# Patient Record
Sex: FEMALE | Race: WHITE | NOT HISPANIC OR LATINO | Employment: PART TIME | URBAN - METROPOLITAN AREA
[De-identification: names, ages, dates, MRNs, and addresses within clinical notes are randomized per-mention and may not be internally consistent; named-entity substitution may affect disease eponyms.]

---

## 2017-01-23 DIAGNOSIS — N95.1 MENOPAUSAL SYNDROME (HOT FLASHES): Primary | ICD-10-CM

## 2017-01-23 RX ORDER — CITALOPRAM HYDROBROMIDE 20 MG/1
20 TABLET ORAL DAILY
Qty: 90 TABLET | Refills: 2 | Status: SHIPPED | OUTPATIENT
Start: 2017-01-23 | End: 2018-11-26

## 2017-01-23 NOTE — TELEPHONE ENCOUNTER
Routing refill request to provider for review/approval because:  Drug not on the [facility] refill protocol for dx attached.  Pt is also due for px  Do you want to give a 30 or 90 day supply?  Latoya Ramon RN, BSN

## 2017-01-23 NOTE — TELEPHONE ENCOUNTER
Pending Prescriptions:                       Disp   Refills    citalopram (CELEXA) 20 MG tablet          90 tab*3            Sig: Take 1 tablet (20 mg) by mouth daily         Last Written Prescription Date: 12/29/2015  Last Fill Quantity: 90, # refills: 3  Last Office Visit with [facility] primary care provider:  08/23/2016        Last PHQ-9 score on record=   PHQ-9 SCORE 5/14/2013   Total Score 6     Harjeet MIRANDA

## 2017-05-01 ENCOUNTER — TELEPHONE (OUTPATIENT)
Dept: FAMILY MEDICINE | Facility: CLINIC | Age: 57
End: 2017-05-01

## 2017-05-01 NOTE — TELEPHONE ENCOUNTER
5/1/2017     Call Regarding Preventive Health Screening Mammogram  Attempt 1     Message on voicemail   Comments:         Outreach   JULIO

## 2017-05-05 NOTE — TELEPHONE ENCOUNTER
5/4/2017     Call Regarding Preventive Health Screening Mammogram  Attempt 1     Message Scheduled  Comments:         Outreach   JULIO

## 2017-05-18 ENCOUNTER — HOSPITAL ENCOUNTER (OUTPATIENT)
Dept: MAMMOGRAPHY | Facility: CLINIC | Age: 57
Discharge: HOME | End: 2017-05-18
Attending: PHYSICIAN ASSISTANT | Admitting: PHYSICIAN ASSISTANT
Payer: COMMERCIAL

## 2017-05-18 DIAGNOSIS — Z12.31 VISIT FOR SCREENING MAMMOGRAM: ICD-10-CM

## 2017-05-18 PROCEDURE — G0202 SCR MAMMO BI INCL CAD: HCPCS

## 2017-05-26 ENCOUNTER — OFFICE VISIT (OUTPATIENT)
Dept: FAMILY MEDICINE | Facility: CLINIC | Age: 57
End: 2017-05-26
Payer: COMMERCIAL

## 2017-05-26 VITALS
OXYGEN SATURATION: 98 % | DIASTOLIC BLOOD PRESSURE: 82 MMHG | HEART RATE: 62 BPM | WEIGHT: 189.5 LBS | BODY MASS INDEX: 29.74 KG/M2 | SYSTOLIC BLOOD PRESSURE: 118 MMHG | HEIGHT: 67 IN | TEMPERATURE: 98 F

## 2017-05-26 DIAGNOSIS — Z11.59 NEED FOR HEPATITIS C SCREENING TEST: ICD-10-CM

## 2017-05-26 DIAGNOSIS — Z12.83 SKIN CANCER SCREENING: ICD-10-CM

## 2017-05-26 DIAGNOSIS — L60.0 INGROWN LEFT BIG TOENAIL: Primary | ICD-10-CM

## 2017-05-26 PROCEDURE — 86803 HEPATITIS C AB TEST: CPT | Performed by: PHYSICIAN ASSISTANT

## 2017-05-26 PROCEDURE — 36415 COLL VENOUS BLD VENIPUNCTURE: CPT | Performed by: PHYSICIAN ASSISTANT

## 2017-05-26 PROCEDURE — 99213 OFFICE O/P EST LOW 20 MIN: CPT | Performed by: PHYSICIAN ASSISTANT

## 2017-05-26 NOTE — PROGRESS NOTES
SUBJECTIVE:                                                    Marisol Wang is a 57 year old female who presents to clinic today for the following health issues:    [place] and did a lot of hiking and lost 8 out of her 10 toenails. When her left big toe nail grow back she has had swelling and erythema along the medial side of the toenail and it's quite painful and does not seem to want to resolve.      Also, she has additional complaints of needs skin cancer screen.  Has a lesion of forehead that wont resolve.        Problem list and histories reviewed & adjusted, as indicated.  Additional history: as documented    Current Outpatient Prescriptions   Medication Sig Dispense Refill     citalopram (CELEXA) 20 MG tablet Take 1 tablet (20 mg) by mouth daily 90 tablet 2     traZODone (DESYREL) 50 MG tablet Take 2 tablets (100 mg) by mouth nightly as needed for sleep 180 tablet 3     frovatriptan (FROVA) 2.5 MG tablet Take 1 tablet (2.5 mg) by mouth at onset of headache for migraine May repeat dose in 2 hours.  Do not exceed 7.5 mg in 24 hours 18 tablet 0     IBUPROFEN        acetaZOLAMIDE (DIAMOX) 125 MG tablet Take 1 tablet (125 mg) by mouth 2 times daily for 14 days 28 tablet 0     BP Readings from Last 3 Encounters:   05/26/17 118/82   08/23/16 103/68   12/29/15 110/80    Wt Readings from Last 3 Encounters:   05/26/17 189 lb 8 oz (86 kg)   08/23/16 186 lb 6.4 oz (84.6 kg)   12/29/15 191 lb (86.6 kg)                    Reviewed and updated as needed this visit by clinical staff  Tobacco  Allergies  Meds  Med Hx  Surg Hx  Fam Hx  Soc Hx      Reviewed and updated as needed this visit by Provider         ROS:  Constitutional, HEENT, cardiovascular, pulmonary, gi and gu systems are negative, except as otherwise noted.    OBJECTIVE:                                                    /82 (BP Location: Right arm, Patient Position: Chair, Cuff Size: Adult Large)  Pulse 62  Temp 98  F (36.7  " C) (Oral)  Ht 5' 6.75\" (1.695 m)  Wt 189 lb 8 oz (86 kg)  SpO2 98%  BMI 29.9 kg/m2  Body mass index is 29.9 kg/(m^2).  MS: erythema and swelling on medial side of toenail of left 1st metatarsal  Skin: papule on rt side of forehead       ASSESSMENT/PLAN:                                                    1. Ingrown left big toenail  Well refer to podiatry for further eval and plan.    - PODIATRY/FOOT & ANKLE SURGERY REFERRAL    2. Skin cancer screening    - DERMATOLOGY REFERRAL    3. Need for hepatitis C screening test    - Hepatitis C Screen Reflex to HCV RNA Quant and Genotype      There are no Patient Instructions on file for this visit.    Ramona Ann Aaseby-Aguilera, PA-C  [facility]    "

## 2017-05-26 NOTE — LETTER
[facility]          [address] [place] [address]                                                                                                       [phone]      Marisol Wang  [address]  [facility] [address]      Dear Marisol,        The results of your recent test were within acceptable range.  Please follow-up if symptoms fail to resolve or worsen.  Enclosed is a copy of the results.      Thank you for choosing [facility]. We appreciate the opportunity to serve you and look forward to supporting your healthcare needs in the future.    If you have any questions or concerns, please call me or my nurse at [phone].        Sincerely,    Savana Avery PA-C      Results for orders placed or performed in visit on 05/26/17   Hepatitis C Screen Reflex to HCV RNA Quant and Genotype   Result Value Ref Range    Hepatitis C Antibody  NR     Nonreactive   Assay performance characteristics have not been established for newborns,   infants, and children

## 2017-05-26 NOTE — NURSING NOTE
"Chief Complaint   Patient presents with     Toe Pain     left big toe       Initial /82 (BP Location: Right arm, Patient Position: Chair, Cuff Size: Adult Large)  Pulse 62  Temp 98  F (36.7  C) (Oral)  Ht 5' 6.75\" (1.695 m)  Wt 189 lb 8 oz (86 kg)  SpO2 98%  BMI 29.9 kg/m2 Estimated body mass index is 29.9 kg/(m^2) as calculated from the following:    Height as of this encounter: 5' 6.75\" (1.695 m).    Weight as of this encounter: 189 lb 8 oz (86 kg).  Medication Reconciliation: complete    "

## 2017-05-26 NOTE — MR AVS SNAPSHOT
After Visit Summary   5/26/2017    Marisol Wang    MRN: [id number]           Patient Information     Date Of Birth          1960        Visit Information        Provider Department      5/26/2017 11:15 AM Aaseby-Aguilera, Ramona Ann, PA-C [facility]        Today's Diagnoses     Ingrown left big toenail    -  1    Skin cancer screening        Need for hepatitis C screening test           Follow-ups after your visit        Additional Services     DERMATOLOGY REFERRAL       Your provider has referred you to: [facility] [phone]   [email or web address]    Please be aware that coverage of these services is subject to the terms and limitations of your health insurance plan.  Call member services at your health plan with any benefit or coverage questions.      Please bring the following with you to your appointment:    (1) Any X-Rays, CTs or MRIs which have been performed.  Contact the facility where they were done to arrange for  prior to your scheduled appointment.    (2) List of current medications  (3) This referral request   (4) Any documents/labs given to you for this referral            PODIATRY/FOOT & ANKLE SURGERY REFERRAL       Your provider has rrgf2znmd you to: [facility]: [facility] [phone]   [email or web address]    Please be aware that coverage of these services is subject to the terms and limitations of your health insurance plan.  Call member services at your health plan with any benefit or coverage questions.      Please bring the following to your appointment:  >>   Any x-rays, CTs or MRIs which have been performed.  Contact the facility where they were done to arrange for  prior to your scheduled appointment.    >>   List of current medications   >>   This referral request   >>   Any documents/labs given to you for this referral                  Who to  "contact     If you have questions or need follow up information about today's clinic visit or your schedule please contact [facility] directly at [phone].  Normal or non-critical lab and imaging results will be communicated to you by MyChart, letter or phone within 4 business days after the clinic has received the results. If you do not hear from us within 7 days, please contact the clinic through MyChart or phone. If you have a critical or abnormal lab result, we will notify you by phone as soon as possible.  Submit refill requests through [employer] or call your pharmacy and they will forward the refill request to us. Please allow 3 business days for your refill to be completed.          Additional Information About Your Visit        [employer] Information     [employer] lets you send messages to your doctor, view your test results, renew your prescriptions, schedule appointments and more. To sign up, go to [email or web address] . Click on \"Log in\" on the left side of the screen, which will take you to the Welcome page. Then click on \"Sign up Now\" on the right side of the page.     You will be asked to enter the access code listed below, as well as some personal information. Please follow the directions to create your username and password.     Your access code is: [email or web address]  Expires: 2017 11:34 AM     Your access code will  in 90 days. If you need help or a new code, please call your [place] clinic or [phone].        Care EveryWhere ID     This is your Care EveryWhere ID. This could be used by other organizations to access your [place] medical records  [id number]        Your Vitals Were     Pulse Temperature Height Pulse Oximetry BMI (Body Mass Index)       62 98  F (36.7  C) (Oral) 5' 6.75\" (1.695 m) 98% 29.9 kg/m2        Blood Pressure from Last 3 Encounters:   17 118/82   16 103/68   12/29/15 110/80    Weight from Last 3 Encounters:   17 189 lb 8 oz (86 " kg)   08/23/16 186 lb 6.4 oz (84.6 kg)   12/29/15 191 lb (86.6 kg)              We Performed the Following     DERMATOLOGY REFERRAL     Hepatitis C Screen Reflex to HCV RNA Quant and Genotype     PODIATRY/FOOT & ANKLE SURGERY REFERRAL        Primary Care Provider Office Phone # Fax #    Savana Ann Aaseby-Aguilera, PA-C [phone]       [facility] [address]  [facility] [address]        Thank you!     Thank you for choosing [facility]  for your care. Our goal is always to provide you with excellent care. Hearing back from our patients is one way we can continue to improve our services. Please take a few minutes to complete the written survey that you may receive in the mail after your visit with us. Thank you!             Your Updated Medication List - Protect others around you: Learn how to safely use, store and throw away your medicines at www.disposemymeds.org.          This list is accurate as of: 5/26/17 11:34 AM.  Always use your most recent med list.                   Brand Name Dispense Instructions for use    acetaZOLAMIDE 125 MG tablet    DIAMOX    28 tablet    Take 1 tablet (125 mg) by mouth 2 times daily for 14 days       citalopram 20 MG tablet    celeXA    90 tablet    Take 1 tablet (20 mg) by mouth daily       frovatriptan 2.5 MG tablet    FROVA    18 tablet    Take 1 tablet (2.5 mg) by mouth at onset of headache for migraine May repeat dose in 2 hours.  Do not exceed 7.5 mg in 24 hours       IBUPROFEN          traZODone 50 MG tablet    DESYREL    180 tablet    Take 2 tablets (100 mg) by mouth nightly as needed for sleep

## 2017-05-28 LAB — HCV AB SERPL QL IA: NORMAL

## 2017-06-09 ENCOUNTER — RADIANT APPOINTMENT (OUTPATIENT)
Dept: GENERAL RADIOLOGY | Facility: CLINIC | Age: 57
End: 2017-06-09
Attending: PODIATRIST
Payer: COMMERCIAL

## 2017-06-09 ENCOUNTER — OFFICE VISIT (OUTPATIENT)
Dept: PODIATRY | Facility: CLINIC | Age: 57
End: 2017-06-09
Payer: COMMERCIAL

## 2017-06-09 VITALS — RESPIRATION RATE: 16 BRPM | BODY MASS INDEX: 29.66 KG/M2 | HEIGHT: 67 IN | WEIGHT: 189 LBS

## 2017-06-09 DIAGNOSIS — L60.0 INGROWING NAIL: ICD-10-CM

## 2017-06-09 DIAGNOSIS — M77.8 CAPSULITIS OF RIGHT FOOT: ICD-10-CM

## 2017-06-09 DIAGNOSIS — M77.8 CAPSULITIS OF RIGHT FOOT: Primary | ICD-10-CM

## 2017-06-09 PROCEDURE — 11730 AVULSION NAIL PLATE SIMPLE 1: CPT | Mod: TA | Performed by: PODIATRIST

## 2017-06-09 PROCEDURE — 73630 X-RAY EXAM OF FOOT: CPT | Mod: RT

## 2017-06-09 PROCEDURE — 99203 OFFICE O/P NEW LOW 30 MIN: CPT | Mod: 25 | Performed by: PODIATRIST

## 2017-06-09 NOTE — PATIENT INSTRUCTIONS
DR. MUNGUIA'S CLINIC LOCATIONS:       MONDAY TUESDAY   [facility]   [address] [address] [address], [address]   [place] [address] [place] [address]   [phone]       WEDNESDAY:  SURGERY    Surgery Scheduling Line - [phone]       THURSDAY AM THURSDAY PM   [facility]   [address], [address]   [place] [address] [place] [address]   [phone]       FRIDAY AM To Schedule an Appointment    [facility] Call: [phone] [address] [place] [address] [phone] PLEASE FAX ALL FORMS TO: [phone]     Follow up: 2 weeks or as needed    INGROWN TOENAIL PROCEDURE INSTRUCTIONS  - After the procedure, go home and elevate the involved foot for the remainder of the day/evening as able.  This is to minimize swelling, control pain, and limit post-procedural complications.  The pre-procedural injection may cause your toe to be numb anywhere from1-2 hours.    - You can take Tylenol, Ibuprofen, Advil, etc as needed for pain if tolerated.  Follow label instructions      - If you have been given a prescription for antibiotics, take them as instructed and complete the prescription.    - Keep dressing intact until the following morning.  At that point, you may remove the bandage (you may need to soak it in warm soapy water as the bandage will likely adhere to your skin).  Soaking in warm soapy water for 5-10 minutes will also facilitate healing.  Wash the toe thoroughly, dry the toe thoroughly.  Apply antibiotic wound ointment to base of wound and cover with gauze and Coban dressing (not too tightly) until it stops draining.  This may take a few days to weeks, but at that point, you may continue with antibiotic ointment and a band-aid, or you may stop applying a dressing all together.  Dressing changes should be done twice daily if  you had the permanent/chemical procedure done.    - You may do activities to tolerance the following day.  Find a shoe that is comfortable and minimizes the amount of rubbing on your toe, as this may increase pain, swelling, etc.    - Monitor for signs of infection.  With this procedure, it is common to have mild surrounding redness and drainage.  If the redness involves the entire great toe or if you notice red streaks on top of your foot, or if you experience any nausea, vomiting, chills, fevers > 101 degrees, call clinic for a quick appointment.    Over the Counter Inserts    Super Feet are the most common and easiest to find.    Locations include any [employer], Sound Cl[employer] in [place] on [address]  and in [place] on [address], [employer] in [place] on [address], [facility] Running Room in [place] on [address], [facility] [address]] Running Room in [place] on [address], Recreational Equipment [employer] in [place] on [address] and [employer] in [place] on [place] and in [place] on [address].    Sofsole Fit can be found at [employer] in [place].  You can also find them online at Sofsole.com    Spenco can be found online and at [employer] Shoe Shop in [place] on [address], Run N' Fun in [facility] on [place], Gear Running Store in [place] on [address], [employer] in [place] on [address] and [employer] in [place] on [address].    Power Step can be a little harder to find.  Locations include [employer] in [place] on [place], [place] in [facility], [employer]over Store in [place] on [place] and online    Walk-Fit - [employer]     [facility]    **  A good high quality over the counter insert can cost around $40-$50.          [employer] LOCATIONS  [place]  [address] [place] [address]  [phone]   [address]  [phone] [place] [address]  [phone]   [place] [address]  [facility] [address]  [phone]   [place]  [address]  [phone] [place] [address]  [phone]   [place]  [address] [facility]  [phone]   [place]  [address] [facility]  [phone] [place]  [address] [address] [phone]           Body Mass Index (BMI)    Many things can cause foot and ankle problems. Foot structure, activity level, foot mechanics and injuries are common causes of pain.    One very important issue that often goes unmentioned, is body weight.  Extra weight can cause increased stress on muscles, ligaments, bones and tendons. Sometimes just a few extra pounds is all it takes to put one over her/his threshold. Without reducing that stress, it can be difficult to alleviate pain.      Some people are uncomfortable addressing this issue, but we feel it is important for you to think about it. As Foot & Ankle specialists, our job is addressing the lower extremity problem and possible causes.     Regarding extra body weight, we encourage patients to discuss diet and weight management plans with their primary care doctors. It is this team approach that gives you the best opportunity for pain relief and getting you back on your feet.

## 2017-06-09 NOTE — NURSING NOTE
"Chief Complaint   Patient presents with     Ingrown Toenail     L hallux nail, medial edge       Initial Resp 16  Ht 5' 6.75\" (1.695 m)  Wt 189 lb (85.7 kg)  BMI 29.82 kg/m2 Estimated body mass index is 29.82 kg/(m^2) as calculated from the following:    Height as of this encounter: 5' 6.75\" (1.695 m).    Weight as of this encounter: 189 lb (85.7 kg).  Medication Reconciliation: complete  "

## 2017-06-09 NOTE — PROGRESS NOTES
"Foot & Ankle Surgery  2017    CC: ingrown lateral L hallux; R forefoot stiffness    I was asked to see Marisol Wang regarding the chief complaint by:  R. Aaseby-Aguilera    HPI:  Pt is a 57 year old female who presents with above complaint.  Ingrown lateral Lh allux, no formal procedure in the past.  She has tried doing the central V-cut to the nail, but it doesn't work for the ingrown lateral L hallux, which hurts along the entire course.  She had previous bunion surg bilateral, and has stiffness R forefoot.  Had used orthotics in the past, but stopped wearing them.  She developed knee and IT band pain right lower extremity, wondering if she needs orthotics    ROS:   Pos for CC.  The patient denies current nausea, vomiting, chills, fevers, belly pain, calf pain, chest pain or SOB.  Complete remainder of ROS is otherwise neg.    VITALS:    Vitals:    17 0828   Resp: 16   Weight: 189 lb (85.7 kg)   Height: 5' 6.75\" (1.695 m)       PMH:    Past Medical History:   Diagnosis Date     Headache(784.0)      Hyperthyroidism        SXHX:    Past Surgical History:   Procedure Laterality Date     BUNIONECTOMY IVORY BILATERAL  2006      SECTION           COLONOSCOPY  2014    Dr. Wooten Count includes the [facility]     COLONOSCOPY N/A 2014    Procedure: COMBINED COLONOSCOPY, SINGLE BIOPSY/POLYPECTOMY BY BIOPSY;  Surgeon: Shanda Wooten MD;  Location:  GI     HEMORRHOIDECTOMY       THYROID SURGERY          MEDS:    Current Outpatient Prescriptions   Medication     citalopram (CELEXA) 20 MG tablet     traZODone (DESYREL) 50 MG tablet     frovatriptan (FROVA) 2.5 MG tablet     IBUPROFEN     acetaZOLAMIDE (DIAMOX) 125 MG tablet     No current facility-administered medications for this visit.        ALL:     Allergies   Allergen Reactions     Hay Fever & [A.R.M.]        [facility]:    Family History   Problem Relation Age of Onset     HEART DISEASE Mother      enlarged heart     Hypertension Mother      " HEART DISEASE Father      open heart surgery, arrythmia     Cancer - colorectal Father      diagnosed at age 65     HEART DISEASE Maternal Grandmother      MI     CANCER Maternal Grandfather      bone     CEREBROVASCULAR DISEASE Paternal Grandfather      DIABETES No family hx of      Breast Cancer No family hx of        SocHx:    Social History     Social History     Marital status:      Spouse name: N/A     Number of children: N/A     Years of education: N/A     Occupational History     Not on file.     Social History Main Topics     Smoking status: Never Smoker     Smokeless tobacco: Never Used     Alcohol use Yes      Comment: social - once every 2 wks     Drug use: No     Sexual activity: Yes     Partners: Male     Birth control/ protection: Rhythm     Other Topics Concern     Parent/Sibling W/ Cabg, Mi Or Angioplasty Before 65f 55m? No     Social History Narrative    , 3 kids ages 21, 19 and 9.  Works as independent            EXAMINATION:  Gen:   No apparent distress  Neuro:   A&Ox3, no deficits  Psych:    Answering questions appropriately for age and situation with normal affect  Head:    NCAT  Eye:    Visual scanning without deficit  Ear:    Response to auditory stimuli wnl  Lung:    Non-labored breathing on RA noted  Abd:    NTND per patient report  Lymph:    Neg for pitting/non-pitting edema BLE  Vasc:    Pulses palpable, CFT minimally delayed  Neuro:    Light touch sensation intact to all sensory nerve distributions without paresthesias  Derm:    Incurvated lateral L hallux without paronychia or SOI  MSK:    R foot - mild recurrent bunion. 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  Ingrown lateral L hallux  -Regarding the ingrown nail, procedure options were discussed.  They elected to go with Partial temporary avulsion.  See procedure note for details.  Risks that were discussed include but are not limited to infection, wound healing complications, nerve irritation, recurrence of the ingrown nail and the need for further procedures.  Follow up 2 weeks for re-evaluation or sooner with acute issues.  Antibiotic:  None needed    After discussing the procedure, as well as risks, complications and post-procedure instructions, informed consent was obtained.    Anesthesia:  3 cc's of  1% lidocaine plain    Procedure:  After adequate prep, and with anesthesia achieved,  attention was directed to the lateral border of the L hallux where the nail plate was freed from surrounding soft tissue.  The offending border was  using an English Anvil and then removed in total.  The base of the wound was explored and showed no necrotic tissue, purulence or debris.   A clean dressing was applied loosely to prevent vascular insult.  The patient tolerated the procedure well without complications.    Post-procedural instructions were dispensed and discussed with the patient.  All questions were answered.    2.  Ino's R 2nd MPJ  -personally reviewed imaging  -supportive stiff-soled shoes  -OTC inserts; will call for Orthotic Lab order  -RICE/NSAID prn           Follow up:  2 weeks or sooner with acute issues      Patient's medical history was reviewed today    Body mass index is 29.82 kg/(m^2).  Weight management plan: Patient was referred to their PCP to discuss a diet and exercise plan.        Brendon Zayas DPM   Podiatric Foot & Ankle Surgeon  [facility]  [phone]

## 2017-06-09 NOTE — Clinical Note
Good morning  I saw Marisol on Friday for her ingrown nail and for R 2nd MPJ pain.  She underwent an avulsion procedure, and we reviewed xray results for her 2nd MPJ pain.  She'll follow up in 2 weeks for a recheck.  Thanks  Brendon

## 2017-06-09 NOTE — MR AVS SNAPSHOT
After Visit Summary   6/9/2017    Marisol Wang    MRN: [id number]           Patient Information     Date Of Birth          1960        Visit Information        Provider Department      6/9/2017 8:30 AM Brendon Zayas DPM [facility]        Today's Diagnoses     Capsulitis of right foot    -  1      Care Instructions      DR. ZAYAS'S CLINIC LOCATIONS:       MONDAY TUESDAY   [facility]   [address] [facility] [address] [address], [address]   [place] [address] [place] [address]   [phone]       WEDNESDAY:  SURGERY    Surgery Scheduling Line - [phone]       THURSDAY AM THURSDAY PM   [facility]   [address] [facility], [address]   [place] [address] [place], [place] [address] [phone]       FRIDAY AM To Schedule an Appointment    [facility] Call: [phone] [address] [place] [address]  [phone] PLEASE FAX ALL FORMS TO: [phone]     Follow up: 2 weeks or as needed    INGROWN TOENAIL PROCEDURE INSTRUCTIONS  - After the procedure, go home and elevate the involved foot for the remainder of the day/evening as able.  This is to minimize swelling, control pain, and limit post-procedural complications.  The pre-procedural injection may cause your toe to be numb anywhere from1-2 hours.    - You can take Tylenol, Ibuprofen, Advil, etc as needed for pain if tolerated.  Follow label instructions      - If you have been given a prescription for antibiotics, take them as instructed and complete the prescription.    - Keep dressing intact until the following morning.  At that point, you may remove the bandage (you may need to soak it in warm soapy water as the bandage will likely adhere to your skin).  Soaking in warm soapy water for 5-10 minutes will also facilitate healing.  Wash the toe  thoroughly, dry the toe thoroughly.  Apply antibiotic wound ointment to base of wound and cover with gauze and Coban dressing (not too tightly) until it stops draining.  This may take a few days to weeks, but at that point, you may continue with antibiotic ointment and a band-aid, or you may stop applying a dressing all together.  Dressing changes should be done twice daily if you had the permanent/chemical procedure done.    - You may do activities to tolerance the following day.  Find a shoe that is comfortable and minimizes the amount of rubbing on your toe, as this may increase pain, swelling, etc.    - Monitor for signs of infection.  With this procedure, it is common to have mild surrounding redness and drainage.  If the redness involves the entire great toe or if you notice red streaks on top of your foot, or if you experience any nausea, vomiting, chills, fevers > 101 degrees, call clinic for a quick appointment.    Over the Counter Inserts    Super Feet are the most common and easiest to find.    Locations include any [employer] Store, [employer] in [place] on [address] [address] and in [place] on [address] [facility] [address] [address], [employer] in [facility] on [facility], [facility] Running Room in [place] [facility] on [facility], [facility] Running Room in [facility] [address], [employer] in [place] on [address] [address] and [employer] in [place] on [place] and in [place] on [facility].    Sofsole Fit can be found at [employer] in [place].  You can also find them online at Sofsole.com    Spenco can be found online and at [employer] in [place] on [address], [employer] in [facility] on [place], Gear Running Store in [place] on [facility], [employer] in [place] on [address] and [employer] in [place] on [address].    Power Step can be a little harder to find.  Locations include [employer] in [place] on [place], [employer] in [place], [employer] Store in  [place] on [address] and online    Walk-Fit - [employer]     [employer] - [facility]    **  A good high quality over the counter insert can cost around $40-$50.          FRANCISCA SHOES LOCATIONS  [place]  [address] [facility]  [phone]   [address]  [phone] [place]  [address]  [phone]   [place]  [address]  [phone]   [place]  [address]  [phone] [place]  [address]  [phone]   [place]  [address]  [phone]   [place]  [address] [place] [address] [phone] [place]  [address] [phone]           Body Mass Index (BMI)    Many things can cause foot and ankle problems. Foot structure, activity level, foot mechanics and injuries are common causes of pain.    One very important issue that often goes unmentioned, is body weight.  Extra weight can cause increased stress on muscles, ligaments, bones and tendons. Sometimes just a few extra pounds is all it takes to put one over her/his threshold. Without reducing that stress, it can be difficult to alleviate pain.      Some people are uncomfortable addressing this issue, but we feel it is important for you to think about it. As Foot & Ankle specialists, our job is addressing the lower extremity problem and possible causes.     Regarding extra body weight, we encourage patients to discuss diet and weight management plans with their primary care doctors. It is this team approach that gives you the best opportunity for pain relief and getting you back on your feet.                    Follow-ups after your visit        Who to contact     If you have questions or need follow up information about today's clinic visit or your schedule please contact [facility] directly at [phone].  Normal or non-critical lab and imaging results will be communicated to you by MyChart, letter or phone within 4 business  "days after the clinic has received the results. If you do not hear from us within 7 days, please contact the clinic through Paperwoven or phone. If you have a critical or abnormal lab result, we will notify you by phone as soon as possible.  Submit refill requests through Paperwoven or call your pharmacy and they will forward the refill request to us. Please allow 3 business days for your refill to be completed.          Additional Information About Your Visit        Paperwoven Information     Paperwoven lets you send messages to your doctor, view your test results, renew your prescriptions, schedule appointments and more. To sign up, go to [email or web address] . Click on \"Log in\" on the left side of the screen, which will take you to the Welcome page. Then click on \"Sign up Now\" on the right side of the page.     You will be asked to enter the access code listed below, as well as some personal information. Please follow the directions to create your username and password.     Your access code is: [email or web address]  Expires: 2017 11:34 AM     Your access code will  in 90 days. If you need help or a new code, please call your [facility] clinic or [phone].        Care EveryWhere ID     This is your Care EveryWhere ID. This could be used by other organizations to access your [facility] medical records  [id number]        Your Vitals Were     Respirations Height BMI (Body Mass Index)             16 5' 6.75\" (1.695 m) 29.82 kg/m2          Blood Pressure from Last 3 Encounters:   17 118/82   16 103/68   12/29/15 110/80    Weight from Last 3 Encounters:   17 189 lb (85.7 kg)   17 189 lb 8 oz (86 kg)   16 186 lb 6.4 oz (84.6 kg)               Primary Care Provider Office Phone # Fax #    Ramona Ann Aaseby-Aguilera, PA-C [phone]       [facility] [address] [address] [facility] [address]        Thank you!     Thank you for choosing [facility]  for your " care. Our goal is always to provide you with excellent care. Hearing back from our patients is one way we can continue to improve our services. Please take a few minutes to complete the written survey that you may receive in the mail after your visit with us. Thank you!             Your Updated Medication List - Protect others around you: Learn how to safely use, store and throw away your medicines at www.disposemymeds.org.          This list is accurate as of: 6/9/17  9:03 AM.  Always use your most recent med list.                   Brand Name Dispense Instructions for use    acetaZOLAMIDE 125 MG tablet    DIAMOX    28 tablet    Take 1 tablet (125 mg) by mouth 2 times daily for 14 days       citalopram 20 MG tablet    celeXA    90 tablet    Take 1 tablet (20 mg) by mouth daily       frovatriptan 2.5 MG tablet    FROVA    18 tablet    Take 1 tablet (2.5 mg) by mouth at onset of headache for migraine May repeat dose in 2 hours.  Do not exceed 7.5 mg in 24 hours       IBUPROFEN          traZODone 50 MG tablet    DESYREL    180 tablet    Take 2 tablets (100 mg) by mouth nightly as needed for sleep

## 2017-10-26 ENCOUNTER — OFFICE VISIT (OUTPATIENT)
Dept: FAMILY MEDICINE | Facility: CLINIC | Age: 57
End: 2017-10-26
Payer: COMMERCIAL

## 2017-10-26 VITALS
OXYGEN SATURATION: 98 % | SYSTOLIC BLOOD PRESSURE: 110 MMHG | TEMPERATURE: 97.7 F | HEART RATE: 65 BPM | DIASTOLIC BLOOD PRESSURE: 70 MMHG | WEIGHT: 189.2 LBS | HEIGHT: 67 IN | BODY MASS INDEX: 29.7 KG/M2

## 2017-10-26 DIAGNOSIS — M25.50 MULTIPLE JOINT PAIN: Primary | ICD-10-CM

## 2017-10-26 DIAGNOSIS — A69.20 LYME DISEASE: ICD-10-CM

## 2017-10-26 LAB
ALBUMIN SERPL-MCNC: 3.9 G/DL (ref 3.4–5)
ALP SERPL-CCNC: 86 U/L (ref 40–150)
ALT SERPL W P-5'-P-CCNC: 25 U/L (ref 0–50)
ANION GAP SERPL CALCULATED.3IONS-SCNC: 7 MMOL/L (ref 3–14)
AST SERPL W P-5'-P-CCNC: 10 U/L (ref 0–45)
BASOPHILS # BLD AUTO: 0 10E9/L (ref 0–0.2)
BASOPHILS NFR BLD AUTO: 0.9 %
BILIRUB SERPL-MCNC: 0.4 MG/DL (ref 0.2–1.3)
BUN SERPL-MCNC: 14 MG/DL (ref 7–30)
CALCIUM SERPL-MCNC: 9.1 MG/DL (ref 8.5–10.1)
CHLORIDE SERPL-SCNC: 107 MMOL/L (ref 94–109)
CO2 SERPL-SCNC: 28 MMOL/L (ref 20–32)
CREAT SERPL-MCNC: 0.73 MG/DL (ref 0.52–1.04)
DIFFERENTIAL METHOD BLD: NORMAL
EOSINOPHIL # BLD AUTO: 0.2 10E9/L (ref 0–0.7)
EOSINOPHIL NFR BLD AUTO: 4 %
ERYTHROCYTE [DISTWIDTH] IN BLOOD BY AUTOMATED COUNT: 13.1 % (ref 10–15)
ERYTHROCYTE [SEDIMENTATION RATE] IN BLOOD BY WESTERGREN METHOD: 9 MM/H (ref 0–30)
GFR SERPL CREATININE-BSD FRML MDRD: 83 ML/MIN/1.7M2
GLUCOSE SERPL-MCNC: 76 MG/DL (ref 70–99)
HCT VFR BLD AUTO: 40.9 % (ref 35–47)
HGB BLD-MCNC: 13.7 G/DL (ref 11.7–15.7)
LYMPHOCYTES # BLD AUTO: 1.1 10E9/L (ref 0.8–5.3)
LYMPHOCYTES NFR BLD AUTO: 23.4 %
MCH RBC QN AUTO: 30.4 PG (ref 26.5–33)
MCHC RBC AUTO-ENTMCNC: 33.5 G/DL (ref 31.5–36.5)
MCV RBC AUTO: 91 FL (ref 78–100)
MONOCYTES # BLD AUTO: 0.4 10E9/L (ref 0–1.3)
MONOCYTES NFR BLD AUTO: 9.4 %
NEUTROPHILS # BLD AUTO: 2.9 10E9/L (ref 1.6–8.3)
NEUTROPHILS NFR BLD AUTO: 62.3 %
PLATELET # BLD AUTO: 280 10E9/L (ref 150–450)
POTASSIUM SERPL-SCNC: 4.3 MMOL/L (ref 3.4–5.3)
PROT SERPL-MCNC: 7.3 G/DL (ref 6.8–8.8)
RBC # BLD AUTO: 4.5 10E12/L (ref 3.8–5.2)
SODIUM SERPL-SCNC: 142 MMOL/L (ref 133–144)
WBC # BLD AUTO: 4.7 10E9/L (ref 4–11)

## 2017-10-26 PROCEDURE — 85025 COMPLETE CBC W/AUTO DIFF WBC: CPT | Performed by: PHYSICIAN ASSISTANT

## 2017-10-26 PROCEDURE — 85652 RBC SED RATE AUTOMATED: CPT | Performed by: PHYSICIAN ASSISTANT

## 2017-10-26 PROCEDURE — 86618 LYME DISEASE ANTIBODY: CPT | Performed by: PHYSICIAN ASSISTANT

## 2017-10-26 PROCEDURE — 36415 COLL VENOUS BLD VENIPUNCTURE: CPT | Performed by: PHYSICIAN ASSISTANT

## 2017-10-26 PROCEDURE — 99214 OFFICE O/P EST MOD 30 MIN: CPT | Performed by: PHYSICIAN ASSISTANT

## 2017-10-26 PROCEDURE — 80053 COMPREHEN METABOLIC PANEL: CPT | Performed by: PHYSICIAN ASSISTANT

## 2017-10-26 RX ORDER — DOXYCYCLINE 100 MG/1
100 CAPSULE ORAL 2 TIMES DAILY
Qty: 28 CAPSULE | Refills: 0 | Status: SHIPPED | OUTPATIENT
Start: 2017-10-26 | End: 2021-09-29

## 2017-10-26 NOTE — LETTER
[facility]   [address] [place] [address] [phone]    October 30, 2017    Marisol Wang  [address] [facility] [address]    Dear Marisol,    Please follow-up if symptoms fail to resolve or worsen.    As always, please let us know if you have questions.  Our clinic number is [phone]      Sincerely,        Savana Avery PA-C      Results for orders placed or performed in visit on 10/26/17   CBC with platelets differential   Result Value Ref Range    WBC 4.7 4.0 - 11.0 10e9/L    RBC Count 4.50 3.8 - 5.2 10e12/L    Hemoglobin 13.7 11.7 - 15.7 g/dL    Hematocrit 40.9 35.0 - 47.0 %    MCV 91 78 - 100 fl    MCH 30.4 26.5 - 33.0 pg    MCHC 33.5 31.5 - 36.5 g/dL    RDW 13.1 10.0 - 15.0 %    Platelet Count 280 150 - 450 10e9/L    Diff Method Automated Method     % Neutrophils 62.3 %    % Lymphocytes 23.4 %    % Monocytes 9.4 %    % Eosinophils 4.0 %    % Basophils 0.9 %    Absolute Neutrophil 2.9 1.6 - 8.3 10e9/L    Absolute Lymphocytes 1.1 0.8 - 5.3 10e9/L    Absolute Monocytes 0.4 0.0 - 1.3 10e9/L    Absolute Eosinophils 0.2 0.0 - 0.7 10e9/L    Absolute Basophils 0.0 0.0 - 0.2 10e9/L   Comprehensive metabolic panel   Result Value Ref Range    Sodium 142 133 - 144 mmol/L    Potassium 4.3 3.4 - 5.3 mmol/L    Chloride 107 94 - 109 mmol/L    Carbon Dioxide 28 20 - 32 mmol/L    Anion Gap 7 3 - 14 mmol/L    Glucose 76 70 - 99 mg/dL    Urea Nitrogen 14 7 - 30 mg/dL    Creatinine 0.73 0.52 - 1.04 mg/dL    GFR Estimate 83 >60 mL/min/1.7m2    GFR Estimate If Black >90 >60 mL/min/1.7m2    Calcium 9.1 8.5 - 10.1 mg/dL    Bilirubin Total 0.4 0.2 - 1.3 mg/dL    Albumin 3.9 3.4 - 5.0 g/dL    Protein Total 7.3 6.8 - 8.8 g/dL    Alkaline Phosphatase 86 40 - 150 U/L    ALT 25 0 - 50 U/L    AST 10 0 - 45 U/L   Erythrocyte sedimentation rate auto   Result Value Ref Range    Sed Rate 9 0 - 30 mm/h   Lyme Disease Audelia with reflex to WB Serum   Result Value Ref Range    Lyme Disease Antibodies  Serum 0.10 0.00 - 0.89

## 2017-10-26 NOTE — MR AVS SNAPSHOT
"              After Visit Summary   10/26/2017    Marisol Wang    MRN: [id number]           Patient Information     Date Of Birth          1960        Visit Information        Provider Department      10/26/2017 11:30 AM Aaseby-Aguilera, Ramona Ann, PA-C [facility]        Today's Diagnoses     Multiple joint pain    -  1    Lyme disease           Follow-ups after your visit        Who to contact     If you have questions or need follow up information about today's clinic visit or your schedule please contact [facility] directly at [phone].  Normal or non-critical lab and imaging results will be communicated to you by xoomparkhart, letter or phone within 4 business days after the clinic has received the results. If you do not hear from us within 7 days, please contact the clinic through xoomparkhart or phone. If you have a critical or abnormal lab result, we will notify you by phone as soon as possible.  Submit refill requests through Cangrade or call your pharmacy and they will forward the refill request to us. Please allow 3 business days for your refill to be completed.          Additional Information About Your Visit        MyChart Information     Cangrade lets you send messages to your doctor, view your test results, renew your prescriptions, schedule appointments and more. To sign up, go to [email or web address] . Click on \"Log in\" on the left side of the screen, which will take you to the Welcome page. Then click on \"Sign up Now\" on the right side of the page.     You will be asked to enter the access code listed below, as well as some personal information. Please follow the directions to create your username and password.     Your access code is: [email or web address]  Expires: 2018  1:51 PM     Your access code will  in 90 days. If you need help or a new code, please call your [facility] or [phone].        Care EveryWhere ID     This is your Care " "EveryWhere ID. This could be used by other organizations to access your [place] medical records  [id number]        Your Vitals Were     Pulse Temperature Height Pulse Oximetry BMI (Body Mass Index)       65 97.7  F (36.5  C) (Oral) 5' 6.75\" (1.695 m) 98% 29.86 kg/m2        Blood Pressure from Last 3 Encounters:   10/26/17 110/70   05/26/17 118/82   08/23/16 103/68    Weight from Last 3 Encounters:   10/26/17 189 lb 3.2 oz (85.8 kg)   06/09/17 189 lb (85.7 kg)   05/26/17 189 lb 8 oz (86 kg)              We Performed the Following     CBC with platelets differential     Comprehensive metabolic panel     Erythrocyte sedimentation rate auto     Lyme Disease Audelia with reflex to WB Serum          Today's Medication Changes          These changes are accurate as of: 10/26/17  1:51 PM.  If you have any questions, ask your nurse or doctor.               Start taking these medicines.        Dose/Directions    doxycycline 100 MG capsule   Commonly known as:  VIBRAMYCIN   Used for:  Lyme disease   Started by:  Aaseby-Aguilera, Ramona Ann, PA-C        Dose:  100 mg   Take 1 capsule (100 mg) by mouth 2 times daily   Quantity:  28 capsule   Refills:  0            Where to get your medicines      These medications were sent to [facility] [address] [place] [address] [address]  AT [facility] [address]  [address] [facility] [address]     Phone:  [phone]     doxycycline 100 MG capsule                Primary Care Provider Office Phone # Fax #    Ramona Ann Aaseby-Aguilera, PA-C [phone] [address] [facility] [address]        Equal Access to Services     SUYAPA PIERRE AH: Federica Marroquin, wakianada luqadaha, qaybta kaalmada adeegyada, arpita campoverde. So [facility] [phone].    ATENCIÓN: Si habla español, tiene a ambrosio disposición servicios gratuitos de asistencia lingüística. Adriana al [phone].    We comply with applicable federal civil " rights laws and [place] laws. We do not discriminate on the basis of race, color, national origin, age, disability, sex, sexual orientation, or gender identity.            Thank you!     Thank you for choosing [facility]  for your care. Our goal is always to provide you with excellent care. Hearing back from our patients is one way we can continue to improve our services. Please take a few minutes to complete the written survey that you may receive in the mail after your visit with us. Thank you!             Your Updated Medication List - Protect others around you: Learn how to safely use, store and throw away your medicines at www.disposemymeds.org.          This list is accurate as of: 10/26/17  1:51 PM.  Always use your most recent med list.                   Brand Name Dispense Instructions for use Diagnosis    acetaZOLAMIDE 125 MG tablet    DIAMOX    28 tablet    Take 1 tablet (125 mg) by mouth 2 times daily for 14 days    Altitude sickness preventative measures       citalopram 20 MG tablet    celeXA    90 tablet    Take 1 tablet (20 mg) by mouth daily    Menopausal syndrome (hot flashes)       doxycycline 100 MG capsule    VIBRAMYCIN    28 capsule    Take 1 capsule (100 mg) by mouth 2 times daily    Lyme disease       frovatriptan 2.5 MG tablet    FROVA    18 tablet    Take 1 tablet (2.5 mg) by mouth at onset of headache for migraine May repeat dose in 2 hours.  Do not exceed 7.5 mg in 24 hours    Nonintractable migraine, unspecified migraine type       IBUPROFEN           traZODone 50 MG tablet    DESYREL    180 tablet    Take 2 tablets (100 mg) by mouth nightly as needed for sleep    Primary insomnia

## 2017-10-26 NOTE — PROGRESS NOTES
"  SUBJECTIVE:   Marisol Wang is a 57 year old female who presents to clinic today for the following health issues:      Was hiking 2 weeks ago and pulled deer ticks off.  hasn't had a rash but has had increased hip, knee and ankle pain bilaterally.  No fever but hasnt felt well.     Check for lyme disease        Problem list and histories reviewed & adjusted, as indicated.  Additional history: as documented    Current Outpatient Prescriptions   Medication Sig Dispense Refill     doxycycline (VIBRAMYCIN) 100 MG capsule Take 1 capsule (100 mg) by mouth 2 times daily 28 capsule 0     citalopram (CELEXA) 20 MG tablet Take 1 tablet (20 mg) by mouth daily 90 tablet 2     traZODone (DESYREL) 50 MG tablet Take 2 tablets (100 mg) by mouth nightly as needed for sleep 180 tablet 3     frovatriptan (FROVA) 2.5 MG tablet Take 1 tablet (2.5 mg) by mouth at onset of headache for migraine May repeat dose in 2 hours.  Do not exceed 7.5 mg in 24 hours 18 tablet 0     IBUPROFEN        acetaZOLAMIDE (DIAMOX) 125 MG tablet Take 1 tablet (125 mg) by mouth 2 times daily for 14 days 28 tablet 0     BP Readings from Last 3 Encounters:   10/26/17 110/70   05/26/17 118/82   08/23/16 103/68    Wt Readings from Last 3 Encounters:   10/26/17 189 lb 3.2 oz (85.8 kg)   06/09/17 189 lb (85.7 kg)   05/26/17 189 lb 8 oz (86 kg)                      Reviewed and updated as needed this visit by clinical staffTobacco  Allergies  Meds  Med Hx  Surg Hx  Fam Hx  Soc Hx      Reviewed and updated as needed this visit by Provider         ROS:  Constitutional, HEENT, cardiovascular, pulmonary, gi and gu systems are negative, except as otherwise noted.      OBJECTIVE:                                                    /70 (BP Location: Right arm, Patient Position: Chair, Cuff Size: Adult Large)  Pulse 65  Temp 97.7  F (36.5  C) (Oral)  Ht 5' 6.75\" (1.695 m)  Wt 189 lb 3.2 oz (85.8 kg)  SpO2 98%  BMI 29.86 kg/m2  Body mass index is " 29.86 kg/(m^2).  GENERAL APPEARANCE: healthy, alert and no distress  HENT: ear canals and TM's normal and nose and mouth without ulcers or lesions  RESP: lungs clear to auscultation - no rales, rhonchi or wheezes  CV: regular rates and rhythm, normal S1 S2, no S3 or S4 and no murmur, click or rub  LYMPHATICS: normal ant/post cervical and supraclavicular nodes  MS: extremities normal- no gross deformities noted  SKIN: no suspicious lesions or rashes    Diagnostic test results:  Diagnostic Test Results:  none        ASSESSMENT/PLAN:                                                    1. Multiple joint pain    - CBC with platelets differential  - Comprehensive metabolic panel  - Erythrocyte sedimentation rate auto  - Lyme Disease Audelia with reflex to WB Serum    2. Lyme disease  Due to significant symptoms after finding  multiple deer ticks, well treat for lyme's.    - doxycycline (VIBRAMYCIN) 100 MG capsule; Take 1 capsule (100 mg) by mouth 2 times daily  Dispense: 28 capsule; Refill: 0      There are no Patient Instructions on file for this visit.    Ramona Ann Aaseby-Aguilera, PA-C  [facility]

## 2017-10-26 NOTE — NURSING NOTE
"Chief Complaint   Patient presents with     check for lyme disease  -  [place]       Initial /70 (BP Location: Right arm, Patient Position: Chair, Cuff Size: Adult Large)  Pulse 65  Temp 97.7  F (36.5  C) (Oral)  Ht 5' 6.75\" (1.695 m)  Wt 189 lb 3.2 oz (85.8 kg)  SpO2 98%  BMI 29.86 kg/m2 Estimated body mass index is 29.86 kg/(m^2) as calculated from the following:    Height as of this encounter: 5' 6.75\" (1.695 m).    Weight as of this encounter: 189 lb 3.2 oz (85.8 kg).  Medication Reconciliation: complete Tiffani Venegas CMA      "

## 2017-10-27 LAB — B BURGDOR IGG+IGM SER QL: 0.1 (ref 0–0.89)

## 2018-05-07 ENCOUNTER — OFFICE VISIT (OUTPATIENT)
Dept: PODIATRY | Facility: CLINIC | Age: 58
End: 2018-05-07
Payer: COMMERCIAL

## 2018-05-07 VITALS
WEIGHT: 189 LBS | DIASTOLIC BLOOD PRESSURE: 68 MMHG | HEIGHT: 67 IN | SYSTOLIC BLOOD PRESSURE: 112 MMHG | BODY MASS INDEX: 29.66 KG/M2

## 2018-05-07 DIAGNOSIS — L60.0 ONYCHOCRYPTOSIS: Primary | ICD-10-CM

## 2018-05-07 PROCEDURE — 11750 EXCISION NAIL&NAIL MATRIX: CPT | Mod: TA | Performed by: PODIATRIST

## 2018-05-07 RX ORDER — SILVER SULFADIAZINE 10 MG/G
CREAM TOPICAL
Qty: 30 G | Refills: 1 | Status: SHIPPED | OUTPATIENT
Start: 2018-05-07 | End: 2021-09-29

## 2018-05-07 NOTE — PROGRESS NOTES
"Foot & Ankle Surgery   May 7, 2018    S:  Pt is seen today for evaluation of ***.    Vitals:    05/07/18 1403   BP: 112/68   Weight: 189 lb (85.7 kg)   Height: 5' 6.75\" (1.695 m)   '      ROS - Pos for CC.  Patient denies current nausea, vomiting, chills, fevers, belly pain, calf pain, chest pain or SOB.  Complete remainder of ROS it otherwise neg.  ***    PE:  Gen:   No apparent distress  Eye:    Visual scanning without deficit  Ear:    Response to auditory stimuli wnl  Lung:    Non-labored breathing on RA noted  Abd:    NTND per patient report  Lymph:    Neg for pitting/non-pitting edema BLE  Vasc:    Pulses palpable, CFT minimally delayed  Neuro:    Light touch sensation intact to all sensory nerve distributions without paresthesias  Derm:    Neg for nodules, lesions or ulcerations  MSK:    ROM, strength wnl without limitation, no pain on palpation noted.  Calf:    Neg for redness, swelling or tenderness  ***    Imaging:  ***    Labs:  ***    Cultures:  ***      Assessment:  58 year old female with ***      Plan:  Discussed etiologies, anatomy and options  1.  ***  -***    Follow up:  *** or sooner with acute issues      Body mass index is 29.82 kg/(m^2).  Weight management plan: Patient was referred to their PCP to discuss a diet and exercise plan.         Brendon Zayas DPM   Podiatric Foot & Ankle Surgeon  [facility]  [phone]  "

## 2018-05-07 NOTE — MR AVS SNAPSHOT
After Visit Summary   5/7/2018    Marisol Wang    MRN: [id number]           Patient Information     Date Of Birth          1960        Visit Information        Provider Department      5/7/2018 2:00 PM Brendon Zayas DPM [facility]        Today's Diagnoses     Onychocryptosis    -  1      Care Instructions    Thank you for choosing [facility]!    DR. ZAYAS'S CLINIC LOCATIONS:   MONDAY - [place] TUESDAY - [place]   [address] [facility]  [address]   [place] [address] [place] [address]   [phone]       THURSDAY AM - [place] THURSDAY PM - [place]   [address]   [place] [address] [place] [address]   [phone]       FRIDAY AM - [place] SET UP SURGERY: [phone] [address] APPOINTMENTS: [phone]   [place] [address] BILLING QUESTIONS: [phone] FAX NUMBER: [phone]     [place]ROWN TOENAIL REMOVAL HOME INSTRUCTIONS  1. After the procedure, go home and elevate the foot/feet for the remainder of the day/evening as able. This is to minimize swelling, control pain, and limit post-procedural complications. The pre-procedural injection may cause your toe to be numb anywhere from 1-2 hours.    2. You can take Tylenol, Ibuprofen, Advil, etc as needed for pain if tolerated. Follow label instructions.     3. If you have been given a prescription for antibiotics, take them as instructed and complete the entire prescription.    4. Keep dressing intact until the following morning. Then remove the bandage (you may need to soak it in warm soapy water as the bandage will likely adhere to your skin).    5. Start soaking in warm soapy water for 5-10 minutes twice a day. Wash the toe thoroughly, dry the toe thoroughly. Apply antibiotic wound ointment to base of wound and cover with gauze and Coban dressing (not too tightly) until it  stops draining. This may take a few days to weeks, but at that point, you may continue with antibiotic ointment and a band-aid, or you may stop applying a dressing all together. Dressing changes should be done twice daily if you had the permanent/chemical procedure done.    6. You may do activities as tolerated the following day. Find a shoe that is comfortable and minimizes the amount of rubbing on your toe, as this may increase pain, swelling, etc.    7. Monitor for signs of infection. With this procedure, it is common to have mild surrounding redness and drainage. If the redness involves the entire great toe or if you notice red streaks on top of your foot, or if you experience any nausea, vomiting, chills, fevers > 101 degrees, call clinic for a quick appointment.        Body Mass Index (BMI)  Many things can cause foot and ankle problems. Foot structure, activity level, foot mechanics and injuries are common causes of pain.  One very important issue that often goes unmentioned, is body weight.  Extra weight can cause increased stress on muscles, ligaments, bones and tendons.  Sometimes just a few extra pounds is all it takes to put one over her/his threshold. Without reducing that stress, it can be difficult to alleviate pain. Some people are uncomfortable addressing this issue, but we feel it is important for you to think about it. As Foot &  Ankle specialists, our job is addressing the lower extremity problem and possible causes. Regarding extra body weight, we encourage patients to discuss diet and weight management plans with their primary care doctors. It is this team approach that gives you the best opportunity for pain relief and getting you back on your feet.              Follow-ups after your visit        Who to contact     If you have questions or need follow up information about today's clinic visit or your schedule please contact [facility] directly at [phone].  Normal or  "non-critical lab and imaging results will be communicated to you by MyChart, letter or phone within 4 business days after the clinic has received the results. If you do not hear from us within 7 days, please contact the clinic through [employer] or phone. If you have a critical or abnormal lab result, we will notify you by phone as soon as possible.  Submit refill requests through [employer] or call your pharmacy and they will forward the refill request to us. Please allow 3 business days for your refill to be completed.          Additional Information About Your Visit        [employer] Information     [employer] gives you secure access to your electronic health record. If you see a primary care provider, you can also send messages to your care team and make appointments. If you have questions, please call your primary care clinic.  If you do not have a primary care provider, please call [phone] and they will assist you.        Care EveryWhere ID     This is your Care EveryWhere ID. This could be used by other organizations to access your [facility] medical records  [id number]        Your Vitals Were     Height                   5' 6.75\" (1.695 m)            Blood Pressure from Last 3 Encounters:   10/26/17 110/70   05/26/17 118/82   08/23/16 103/68    Weight from Last 3 Encounters:   10/26/17 189 lb 3.2 oz (85.8 kg)   06/09/17 189 lb (85.7 kg)   05/26/17 189 lb 8 oz (86 kg)              Today, you had the following     No orders found for display         Today's Medication Changes          These changes are accurate as of 5/7/18  2:12 PM.  If you have any questions, ask your nurse or doctor.               Start taking these medicines.        Dose/Directions    silver sulfADIAZINE 1 % cream   Commonly known as:  SILVADENE   Used for:  Onychocryptosis   Started by:  Brendon Zayas DPM        Apply to procedure site lateral left great toe twice daily with dressing changes until healed.   Quantity:  30 g   Refills:  1 "            Where to get your medicines      These medications were sent to [facility] [address] [facility]  [address], [facility] [address]     Phone:  [phone]     silver sulfADIAZINE 1 % cream                Primary Care Provider Office Phone # Fax #    Savana Ann Aaseby-Aguilera, PA-C [phone] [address]  [facility] [address]        Equal Access to Services     [facility]: Hadii aad ku hadasho Soomaali, waaxda luqadaha, qaybta kaalmada adeegyada, waxay becki haycarmen lee . So [facility] [phone].    ATENCIÓN: Si habla español, tiene a ambrosio disposición servicios gratuitos de asistencia lingüística. [facility] [phone].    We comply with applicable federal civil rights laws and [place] laws. We do not discriminate on the basis of race, color, national origin, age, disability, sex, sexual orientation, or gender identity.            Thank you!     Thank you for choosing [facility]  for your care. Our goal is always to provide you with excellent care. Hearing back from our patients is one way we can continue to improve our services. Please take a few minutes to complete the written survey that you may receive in the mail after your visit with us. Thank you!             Your Updated Medication List - Protect others around you: Learn how to safely use, store and throw away your medicines at www.disposemymeds.org.          This list is accurate as of 5/7/18  2:12 PM.  Always use your most recent med list.                   Brand Name Dispense Instructions for use Diagnosis    acetaZOLAMIDE 125 MG tablet    DIAMOX    28 tablet    Take 1 tablet (125 mg) by mouth 2 times daily for 14 days    Altitude sickness preventative measures       citalopram 20 MG tablet    celeXA    90 tablet    Take 1 tablet (20 mg) by mouth daily    Menopausal syndrome (hot flashes)       doxycycline 100 MG  capsule    VIBRAMYCIN    28 capsule    Take 1 capsule (100 mg) by mouth 2 times daily    Lyme disease       frovatriptan 2.5 MG tablet    FROVA    18 tablet    Take 1 tablet (2.5 mg) by mouth at onset of headache for migraine May repeat dose in 2 hours.  Do not exceed 7.5 mg in 24 hours    Nonintractable migraine, unspecified migraine type       IBUPROFEN           silver sulfADIAZINE 1 % cream    SILVADENE    30 g    Apply to procedure site lateral left great toe twice daily with dressing changes until healed.    Onychocryptosis       traZODone 50 MG tablet    DESYREL    180 tablet    Take 2 tablets (100 mg) by mouth nightly as needed for sleep    Primary insomnia

## 2018-05-07 NOTE — Clinical Note
Good afternoon  I saw Marisol today for recurrent ingrown nail lateral L hallux. She underwent a partial permanent removal and will follow up in 2 weeks for a wound check.  Thanks  Brendon

## 2018-05-07 NOTE — PROGRESS NOTES
"Foot & Ankle Surgery   May 7, 2018    S:  Pt is seen today for evaluation of L hallux pain.  She was previously seen June 2017 for multiple issues, including painful ingrown nail lateral L hallux.  She underwent a partial temporary avulsion and did not follow up.  She presents today stating the nail has been more problematic, requiring routine maintenance.  She'd like it removed permanently.     Vitals:    05/07/18 1403   Height: 5' 6.75\" (1.695 m)   '      ROS - Pos for CC.  Patient denies current nausea, vomiting, chills, fevers, belly pain, calf pain, chest pain or SOB.  Complete remainder of ROS it otherwise neg.      PE:  Gen:   No apparent distress  Eye:    Visual scanning without deficit  Ear:    Response to auditory stimuli wnl  Lung:    Non-labored breathing on RA noted  Abd:    NTND per patient report  Lymph:    Neg for pitting/non-pitting edema BLE  Vasc:    Pulses palpable, CFT minimally delayed  Neuro:    Light touch sensation intact to all sensory nerve distributions without paresthesias  Derm:    Onychocryptosis lateral L hallux without paronychia  MSK:    ROM, strength wnl without limitation, no pain on palpation noted.  Calf:    Neg for redness, swelling or tenderness    Assessment:  58 year old female with recurrent onychocryptosis lateral L hallux      Plan:  Discussed etiologies, anatomy and options  1.  Recurrent onychocryptosis lateral L hallux  -Regarding the ingrown nail, procedure options were discussed.  They elected to go with Partial permanent avulsion.  See procedure note for details.  Risks that were discussed include but are not limited to infection, wound healing complications, nerve irritation, recurrence of the ingrown nail and the need for further procedures.  Follow up 2 weeks for re-evaluation or sooner with acute issues.  Antibiotic:  None needed  -Rx for silvadene for BID dressing changes     After discussing the procedure, as well as risks, complications and post-procedure " instructions, informed consent was obtained.    Anesthesia:  3 cc's of  1% lidocaine plain    Procedure:  After adequate prep, and with anesthesia achieved, a tourni-cot was applied to the involved toe(and removed after bandage application) and  attention was directed to the lateral border of the L hallux where the nail plate was freed from surrounding soft tissue.  The offending border was  using an English Anvil and then removed in total.  The base of the wound was explored and showed no necrotic tissue, purulence or debris.  Phenol was then applied to the base of the wound for 30s x 3, and sufficient isopropyl alcohol was used to irrigate the wound.  A clean dressing was applied loosely to prevent vascular insult.  The patient tolerated the procedure well without complications.    Post-procedural instructions were dispensed and discussed with the patient.  All questions were answered.          Follow up:  2 weeks or sooner with acute issues      Body mass index is 29.82 kg/(m^2).    Weight management plan: Patient was referred to their PCP to discuss a diet and exercise plan.         Brendon Zayas DPM   Podiatric Foot & Ankle Surgeon  [facility]  [phone]

## 2018-05-07 NOTE — PATIENT INSTRUCTIONS
Thank you for choosing [facility]!    DR. MUNGUIA'S CLINIC LOCATIONS:   MONDAY - [place] TUESDAY - [place]   [address] [facility]  [address]   [place] [address] [place] [address]   [phone]       THURSDAY AM - [place] THURSDAY PM - [place]   [address]   [place] [address] [place] [address]   [phone]       FRIDAY AM - [place] SET UP SURGERY: [phone] [address] APPOINTMENTS: [phone]   [place] [address] BILLING QUESTIONS: [phone] FAX NUMBER: [phone]     INGROWN TOENAIL REMOVAL HOME INSTRUCTIONS  1. After the procedure, go home and elevate the foot/feet for the remainder of the day/evening as able. This is to minimize swelling, control pain, and limit post-procedural complications. The pre-procedural injection may cause your toe to be numb anywhere from 1-2 hours.    2. You can take Tylenol, Ibuprofen, Advil, etc as needed for pain if tolerated. Follow label instructions.     3. If you have been given a prescription for antibiotics, take them as instructed and complete the entire prescription.    4. Keep dressing intact until the following morning. Then remove the bandage (you may need to soak it in warm soapy water as the bandage will likely adhere to your skin).    5. Start soaking in warm soapy water for 5-10 minutes twice a day. Wash the toe thoroughly, dry the toe thoroughly. Apply antibiotic wound ointment to base of wound and cover with gauze and Coban dressing (not too tightly) until it stops draining. This may take a few days to weeks, but at that point, you may continue with antibiotic ointment and a band-aid, or you may stop applying a dressing all together. Dressing changes should be done twice daily if you had the permanent/chemical procedure done.    6. You may do activities as tolerated the following day. Find a shoe that is  comfortable and minimizes the amount of rubbing on your toe, as this may increase pain, swelling, etc.    7. Monitor for signs of infection. With this procedure, it is common to have mild surrounding redness and drainage. If the redness involves the entire great toe or if you notice red streaks on top of your foot, or if you experience any nausea, vomiting, chills, fevers > 101 degrees, call clinic for a quick appointment.        Body Mass Index (BMI)  Many things can cause foot and ankle problems. Foot structure, activity level, foot mechanics and injuries are common causes of pain.  One very important issue that often goes unmentioned, is body weight.  Extra weight can cause increased stress on muscles, ligaments, bones and tendons.  Sometimes just a few extra pounds is all it takes to put one over her/his threshold. Without reducing that stress, it can be difficult to alleviate pain. Some people are uncomfortable addressing this issue, but we feel it is important for you to think about it. As Foot &  Ankle specialists, our job is addressing the lower extremity problem and possible causes. Regarding extra body weight, we encourage patients to discuss diet and weight management plans with their primary care doctors. It is this team approach that gives you the best opportunity for pain relief and getting you back on your feet.

## 2018-06-27 DIAGNOSIS — F51.01 PRIMARY INSOMNIA: ICD-10-CM

## 2018-06-27 RX ORDER — TRAZODONE HYDROCHLORIDE 50 MG/1
100 TABLET, FILM COATED ORAL
Qty: 180 TABLET | Refills: 0 | Status: SHIPPED | OUTPATIENT
Start: 2018-06-27 | End: 2018-12-06

## 2018-06-27 NOTE — TELEPHONE ENCOUNTER
Last Written Prescription Date:  8/23/2016  Last Fill Quantity: 180,  # refills: 3   Last office visit: 10/26/2017 with prescribing provider:  Savana   Future Office Visit:      Requested Prescriptions   Pending Prescriptions Disp Refills     traZODone (DESYREL) 50 MG tablet 180 tablet 3     Sig: Take 2 tablets (100 mg) by mouth nightly as needed for sleep    There is no refill protocol information for this order        Prescription approved per [facility] Refill Protocol.  Latoya Ramon RN, BSN

## 2018-08-03 DIAGNOSIS — G43.009 NONINTRACTABLE MIGRAINE, UNSPECIFIED MIGRAINE TYPE: ICD-10-CM

## 2018-08-03 RX ORDER — FROVATRIPTAN SUCCINATE 2.5 MG/1
2.5 TABLET, FILM COATED ORAL
Qty: 18 TABLET | Refills: 0 | Status: SHIPPED | OUTPATIENT
Start: 2018-08-03 | End: 2018-11-26

## 2018-08-03 NOTE — TELEPHONE ENCOUNTER
"Requested Prescriptions   Pending Prescriptions Disp Refills     frovatriptan (FROVA) 2.5 MG tablet 18 tablet 0    Last Written Prescription Date:  12/29/2015  Last Fill Quantity: 18 tablet,  # refills: 0   Last office visit: 10/26/2017 with prescribing provider:  10/26/2017   Future Office Visit:     Sig: Take 1 tablet (2.5 mg) by mouth at onset of headache for migraine May repeat dose in 2 hours.  Do not exceed 7.5 mg in 24 hours    Serotonin Agonists Failed    8/3/2018 11:51 AM       Failed - Serotonin Agonist request needs review.    Please review patient's record. If patient has had 8 or more treatments in the past month, please forward to provider.         Passed - Blood pressure under 140/90 in past 12 months    BP Readings from Last 3 Encounters:   05/07/18 112/68   10/26/17 110/70   05/26/17 118/82                Passed - Recent (12 mo) or future (30 days) visit within the authorizing provider's specialty    Patient had office visit in the last 12 months or has a visit in the next 30 days with authorizing provider or within the authorizing provider's specialty.  See \"Patient Info\" tab in inbasket, or \"Choose Columns\" in Meds & Orders section of the refill encounter.           Passed - Patient is age 18 or older       Passed - No active pregnancy on record       Passed - No positive pregnancy test in past 12 months          Harjeet MIRANDA  "

## 2018-08-03 NOTE — TELEPHONE ENCOUNTER
Routing refill request to provider for review/approval because:  Drug not active on patient's medication list as it hasn't been prescribed since 2015  Latoya Ramon RN, BSN

## 2018-11-26 DIAGNOSIS — N95.1 MENOPAUSAL SYNDROME (HOT FLASHES): ICD-10-CM

## 2018-11-26 DIAGNOSIS — G43.009 NONINTRACTABLE MIGRAINE, UNSPECIFIED MIGRAINE TYPE: ICD-10-CM

## 2018-11-26 RX ORDER — CITALOPRAM HYDROBROMIDE 20 MG/1
TABLET ORAL
Qty: 90 TABLET | Refills: 0 | Status: CANCELLED | OUTPATIENT
Start: 2018-11-26

## 2018-11-26 RX ORDER — FROVATRIPTAN SUCCINATE 2.5 MG/1
TABLET, FILM COATED ORAL
Qty: 18 TABLET | Refills: 0 | Status: SHIPPED | OUTPATIENT
Start: 2018-11-26 | End: 2020-01-27

## 2018-11-26 RX ORDER — CITALOPRAM HYDROBROMIDE 20 MG/1
20 TABLET ORAL DAILY
Qty: 30 TABLET | Refills: 0 | Status: SHIPPED | OUTPATIENT
Start: 2018-11-26 | End: 2018-12-06

## 2018-11-26 NOTE — TELEPHONE ENCOUNTER
"Requested Prescriptions   Pending Prescriptions Disp Refills     citalopram (CELEXA) 20 MG tablet [Pharmacy Med Name: CITALOPRAM 20MG TABLETS] 90 tablet 0    Last Written Prescription Date:  01/23/2017  Last Fill Quantity: 90 tablet,  # refills: 2   Last office visit: 10/26/2017 with prescribing provider:  10/26/2017   Future Office Visit:     Sig: TAKE 1 TABLET(20 MG) BY MOUTH DAILY    SSRIs Protocol Failed    11/26/2018  1:34 PM       Failed - Recent (12 mo) or future (30 days) visit within the authorizing provider's specialty    Patient had office visit in the last 12 months or has a visit in the next 30 days with authorizing provider or within the authorizing provider's specialty.  See \"Patient Info\" tab in inbasket, or \"Choose Columns\" in Meds & Orders section of the refill encounter.             Passed - Patient is age 18 or older       Passed - No active pregnancy on record       Passed - No positive pregnancy test in last 12 months          Harjeet MIRANDA  "

## 2018-11-26 NOTE — TELEPHONE ENCOUNTER
"Requested Prescriptions   Pending Prescriptions Disp Refills     frovatriptan (FROVA) 2.5 MG tablet [Pharmacy Med Name: FROVATRIPTAN 2.5MG TABLETS] 18 tablet 0    Last Written Prescription Date:  08/03/2018  Last Fill Quantity: 18 tablet,  # refills: 0   Last office visit: 10/26/2017 with prescribing provider:  10/26/2017   Future Office Visit:     Sig: TAKE 1 TABLET BY MOUTH AT ONSET OF HEADACHE FOR MIGRAINE. MAY REPEAT DOSE IN 2 HOURS. DO NOT EXCEED 7.5MG IN 24 HOURS    Serotonin Agonists Failed    11/26/2018  1:31 PM       Failed - Serotonin Agonist request needs review.    Please review patient's record. If patient has had 8 or more treatments in the past month, please forward to provider.         Failed - Recent (12 mo) or future (30 days) visit within the authorizing provider's specialty    Patient had office visit in the last 12 months or has a visit in the next 30 days with authorizing provider or within the authorizing provider's specialty.  See \"Patient Info\" tab in inbasket, or \"Choose Columns\" in Meds & Orders section of the refill encounter.             Passed - Blood pressure under 140/90 in past 12 months    BP Readings from Last 3 Encounters:   05/07/18 112/68   10/26/17 110/70   05/26/17 118/82                Passed - Patient is age 18 or older       Passed - No active pregnancy on record       Passed - No positive pregnancy test in past 12 months        Requested Prescriptions   Pending Prescriptions Disp Refills     citalopram (CELEXA) 20 MG tablet [Pharmacy Med Name: CITALOPRAM 20MG TABLETS] 90 tablet 0    Last Written Prescription Date:  01/23/2017  Last Fill Quantity: 90 tablet,  # refills: 2   Last office visit: 10/26/2017 with prescribing provider:  10/26/2017   Future Office Visit:     Sig: TAKE 1 TABLET(20 MG) BY MOUTH DAILY    SSRIs Protocol Failed    11/26/2018  1:34 PM       Failed - Recent (12 mo) or future (30 days) visit within the authorizing provider's specialty    Patient had " "office visit in the last 12 months or has a visit in the next 30 days with authorizing provider or within the authorizing provider's specialty.  See \"Patient Info\" tab in inbasket, or \"Choose Columns\" in Meds & Orders section of the refill encounter.             Passed - Patient is age 18 or older       Passed - No active pregnancy on record       Passed - No positive pregnancy test in last 12 months              Harjeet MIRANDA  "

## 2018-11-26 NOTE — TELEPHONE ENCOUNTER
Routing refill request to provider for review/approval because:  Drug interaction warning=  Pt did schedule PX with your for 12/6      Lisa Stoll RN

## 2018-11-27 DIAGNOSIS — G43.009 NONINTRACTABLE MIGRAINE, UNSPECIFIED MIGRAINE TYPE: ICD-10-CM

## 2018-11-27 RX ORDER — FROVATRIPTAN SUCCINATE 2.5 MG/1
TABLET, FILM COATED ORAL
Qty: 18 TABLET | Refills: 0 | OUTPATIENT
Start: 2018-11-27

## 2018-11-27 NOTE — TELEPHONE ENCOUNTER
Duplicate  E-Prescribing Status: Receipt confirmed by pharmacy (11/26/2018  4:50 PM CST)  Latoya Ramon RN, BSN

## 2018-11-27 NOTE — TELEPHONE ENCOUNTER
"Requested Prescriptions   Pending Prescriptions Disp Refills     frovatriptan (FROVA) 2.5 MG tablet [Pharmacy Med Name: FROVATRIPTAN 2.5MG TABLETS] 18 tablet 0    Last Written Prescription Date:  11/26/2018  Last Fill Quantity: 18 tablet,  # refills: 0   Last office visit: 10/26/2017 with prescribing provider:  10/26/2017   Future Office Visit:   Next 5 appointments (look out 90 days)     Dec 06, 2018  1:00 PM CST   PHYSICAL with Ramona Ann Aaseby-Aguilera, PA-C   [facility]    [address] [facility] [address] [phone]                  Sig: TAKE 1 TABLET BY MOUTH AT ONSET OF HEADACHE FOR MIGRAINE. MAY REPEAT IN 2 HOURS. DO NOT EXCEED 7.5 MG IN 24 HOURS.    Serotonin Agonists Failed    11/27/2018  3:27 AM       Failed - Serotonin Agonist request needs review.    Please review patient's record. If patient has had 8 or more treatments in the past month, please forward to provider.         Passed - Blood pressure under 140/90 in past 12 months    BP Readings from Last 3 Encounters:   05/07/18 112/68   10/26/17 110/70   05/26/17 118/82                Passed - Recent (12 mo) or future (30 days) visit within the authorizing provider's specialty    Patient had office visit in the last 12 months or has a visit in the next 30 days with authorizing provider or within the authorizing provider's specialty.  See \"Patient Info\" tab in inbasket, or \"Choose Columns\" in Meds & Orders section of the refill encounter.             Passed - Patient is age 18 or older       Passed - No active pregnancy on record       Passed - No positive pregnancy test in past 12 months          Harjeet LESTERT  "

## 2018-12-06 ENCOUNTER — OFFICE VISIT (OUTPATIENT)
Dept: FAMILY MEDICINE | Facility: CLINIC | Age: 58
End: 2018-12-06
Payer: COMMERCIAL

## 2018-12-06 VITALS
HEIGHT: 67 IN | TEMPERATURE: 98.3 F | DIASTOLIC BLOOD PRESSURE: 68 MMHG | HEART RATE: 62 BPM | OXYGEN SATURATION: 97 % | BODY MASS INDEX: 29.07 KG/M2 | WEIGHT: 185.2 LBS | SYSTOLIC BLOOD PRESSURE: 103 MMHG

## 2018-12-06 DIAGNOSIS — Z13.220 LIPID SCREENING: ICD-10-CM

## 2018-12-06 DIAGNOSIS — Z00.00 ROUTINE GENERAL MEDICAL EXAMINATION AT A HEALTH CARE FACILITY: Primary | ICD-10-CM

## 2018-12-06 DIAGNOSIS — N95.1 MENOPAUSAL SYNDROME (HOT FLASHES): ICD-10-CM

## 2018-12-06 DIAGNOSIS — F51.01 PRIMARY INSOMNIA: ICD-10-CM

## 2018-12-06 DIAGNOSIS — Z13.29 SCREENING FOR THYROID DISORDER: ICD-10-CM

## 2018-12-06 DIAGNOSIS — Z13.1 SCREENING FOR DIABETES MELLITUS: ICD-10-CM

## 2018-12-06 DIAGNOSIS — Z13.0 SCREENING FOR DISORDER OF BLOOD AND BLOOD-FORMING ORGANS: ICD-10-CM

## 2018-12-06 DIAGNOSIS — Z12.4 ROUTINE CERVICAL SMEAR: ICD-10-CM

## 2018-12-06 PROCEDURE — 87624 HPV HI-RISK TYP POOLED RSLT: CPT | Performed by: PHYSICIAN ASSISTANT

## 2018-12-06 PROCEDURE — G0145 SCR C/V CYTO,THINLAYER,RESCR: HCPCS | Performed by: PHYSICIAN ASSISTANT

## 2018-12-06 PROCEDURE — 99396 PREV VISIT EST AGE 40-64: CPT | Performed by: PHYSICIAN ASSISTANT

## 2018-12-06 RX ORDER — TRAZODONE HYDROCHLORIDE 50 MG/1
100 TABLET, FILM COATED ORAL
Qty: 180 TABLET | Refills: 3 | Status: SHIPPED | OUTPATIENT
Start: 2018-12-06 | End: 2019-12-30

## 2018-12-06 RX ORDER — CITALOPRAM HYDROBROMIDE 20 MG/1
20 TABLET ORAL DAILY
Qty: 90 TABLET | Refills: 3 | Status: SHIPPED | OUTPATIENT
Start: 2018-12-06 | End: 2020-01-02

## 2018-12-06 ASSESSMENT — ENCOUNTER SYMPTOMS
HEMATURIA: 0
SORE THROAT: 0
PALPITATIONS: 0
DIARRHEA: 0
PARESTHESIAS: 0
FEVER: 0
DIZZINESS: 0
JOINT SWELLING: 0
HEADACHES: 1
COUGH: 0
CHILLS: 0
EYE PAIN: 0
WEAKNESS: 0
DYSURIA: 0
ARTHRALGIAS: 1
HEARTBURN: 0
FREQUENCY: 0
NAUSEA: 0
CONSTIPATION: 0
ABDOMINAL PAIN: 0
NERVOUS/ANXIOUS: 0
HEMATOCHEZIA: 0
BREAST MASS: 0
MYALGIAS: 1
SHORTNESS OF BREATH: 0

## 2018-12-06 ASSESSMENT — PATIENT HEALTH QUESTIONNAIRE - PHQ9
SUM OF ALL RESPONSES TO PHQ QUESTIONS 1-9: 6
5. POOR APPETITE OR OVEREATING: NOT AT ALL

## 2018-12-06 ASSESSMENT — ANXIETY QUESTIONNAIRES
6. BECOMING EASILY ANNOYED OR IRRITABLE: SEVERAL DAYS
5. BEING SO RESTLESS THAT IT IS HARD TO SIT STILL: NOT AT ALL
3. WORRYING TOO MUCH ABOUT DIFFERENT THINGS: SEVERAL DAYS
1. FEELING NERVOUS, ANXIOUS, OR ON EDGE: SEVERAL DAYS
GAD7 TOTAL SCORE: 4
7. FEELING AFRAID AS IF SOMETHING AWFUL MIGHT HAPPEN: SEVERAL DAYS
2. NOT BEING ABLE TO STOP OR CONTROL WORRYING: NOT AT ALL
IF YOU CHECKED OFF ANY PROBLEMS ON THIS QUESTIONNAIRE, HOW DIFFICULT HAVE THESE PROBLEMS MADE IT FOR YOU TO DO YOUR WORK, TAKE CARE OF THINGS AT HOME, OR GET ALONG WITH OTHER PEOPLE: SOMEWHAT DIFFICULT

## 2018-12-06 NOTE — MR AVS SNAPSHOT
After Visit Summary   12/6/2018    Marisol Wang    MRN: [id number]           Patient Information     Date Of Birth          1960        Visit Information        Provider Department      12/6/2018 1:00 PM Aaseby-Aguilera, Ramona Ann, PA-C [facility]        Today's Diagnoses     Screening for thyroid disorder    -  1    Screening for HIV (human immunodeficiency virus)        Need for prophylactic vaccination and inoculation against influenza        Routine general medical examination at a health care facility        Menopausal syndrome (hot flashes)        Primary insomnia        Screening for diabetes mellitus        Lipid screening        Screening for disorder of blood and blood-forming organs          Care Instructions    Fasting lab appt        Preventive Health Recommendations  Female Ages 50 - 64    Yearly exam: See your health care provider every year in order to  o Review health changes.   o Discuss preventive care.    o Review your medicines if your doctor has prescribed any.      Get a Pap test every three years (unless you have an abnormal result and your provider advises testing more often).    If you get Pap tests with HPV test, you only need to test every 5 years, unless you have an abnormal result.     You do not need a Pap test if your uterus was removed (hysterectomy) and you have not had cancer.    You should be tested each year for STDs (sexually transmitted diseases) if you're at risk.     Have a mammogram every 1 to 2 years.    Have a colonoscopy at age 50, or have a yearly FIT test (stool test). These exams screen for colon cancer.      Have a cholesterol test every 5 years, or more often if advised.    Have a diabetes test (fasting glucose) every three years. If you are at risk for diabetes, you should have this test more often.     If you are at risk for osteoporosis (brittle bone disease), think about having a bone density scan (DEXA).    Shots: Get  a flu shot each year. Get a tetanus shot every 10 years.    Nutrition:     Eat at least 5 servings of fruits and vegetables each day.    Eat whole-grain bread, whole-wheat pasta and brown rice instead of white grains and rice.    Get adequate Calcium and Vitamin D.     Lifestyle    Exercise at least 150 minutes a week (30 minutes a day, 5 days a week). This will help you control your weight and prevent disease.    Limit alcohol to one drink per day.    No smoking.     Wear sunscreen to prevent skin cancer.     See your dentist every six months for an exam and cleaning.    See your eye doctor every 1 to 2 years.            Follow-ups after your visit        Follow-up notes from your care team     Return in about 1 year (around 12/6/2019) for Physical Exam.      Future tests that were ordered for you today     Open Future Orders        Priority Expected Expires Ordered    CBC with platelets Routine  3/6/2019 12/6/2018    Comprehensive metabolic panel Routine  3/6/2019 12/6/2018    Lipid panel reflex to direct LDL Fasting Routine  3/6/2019 12/6/2018    TSH with free T4 reflex Routine  3/6/2019 12/6/2018            Who to contact     If you have questions or need follow up information about today's clinic visit or your schedule please contact [facility] directly at [phone].  Normal or non-critical lab and imaging results will be communicated to you by Festickethart, letter or phone within 4 business days after the clinic has received the results. If you do not hear from us within 7 days, please contact the clinic through Festickethart or phone. If you have a critical or abnormal lab result, we will notify you by phone as soon as possible.  Submit refill requests through Element ID or call your pharmacy and they will forward the refill request to us. Please allow 3 business days for your refill to be completed.          Additional Information About Your Visit        Element ID Information     Element ID gives you secure  "access to your electronic health record. If you see a primary care provider, you can also send messages to your care team and make appointments. If you have questions, please call your primary care clinic.  If you do not have a primary care provider, please call [phone] and they will assist you.        Care EveryWhere ID     This is your Care EveryWhere ID. This could be used by other organizations to access your [facility] medical records  [id number]        Your Vitals Were     Pulse Temperature Height Pulse Oximetry BMI (Body Mass Index)       62 98.3  F (36.8  C) (Oral) 5' 7\" (1.702 m) 97% 29.01 kg/m2        Blood Pressure from Last 3 Encounters:   12/06/18 103/68   05/07/18 112/68   10/26/17 110/70    Weight from Last 3 Encounters:   12/06/18 185 lb 3.2 oz (84 kg)   05/07/18 189 lb (85.7 kg)   10/26/17 189 lb 3.2 oz (85.8 kg)                 Where to get your medicines      These medications were sent to [facility] [address] [place], [place] [address] [facility] [place] [address] [facility] [employer] [address]     Phone:  [phone]     citalopram 20 MG tablet    traZODone 50 MG tablet          Primary Care Provider Office Phone # Fax #    Savana Ann Aaseby-Aguilera, PA-C [phone] [address] [facility] [address]        Equal Access to Services     [facility] Hospital[facility]ARINDER : Hadii harley ku hadasho Soomaali, waaxda luqadaha, qaybta kaalmada [place]edenton, arpita campoverde. So [facility] [phone].    ATENCIÓN: Si habla español, tiene a ambrosio disposición servicios gratuitos de asistencia lingüística. Llame al [phone].    We comply with applicable federal civil rights laws and [place] laws. We do not discriminate on the basis of race, color, national origin, age, disability, sex, sexual orientation, or gender identity.            Thank you!     Thank you for choosing [facility]  for your care. Our goal is always to provide you " with excellent care. Hearing back from our patients is one way we can continue to improve our services. Please take a few minutes to complete the written survey that you may receive in the mail after your visit with us. Thank you!             Your Updated Medication List - Protect others around you: Learn how to safely use, store and throw away your medicines at www.disposemymeds.org.          This list is accurate as of 12/6/18  1:36 PM.  Always use your most recent med list.                   Brand Name Dispense Instructions for use Diagnosis    acetaZOLAMIDE 125 MG tablet    DIAMOX    28 tablet    Take 1 tablet (125 mg) by mouth 2 times daily for 14 days    Altitude sickness preventative measures       citalopram 20 MG tablet    celeXA    90 tablet    Take 1 tablet (20 mg) by mouth daily    Menopausal syndrome (hot flashes)       doxycycline hyclate 100 MG capsule    VIBRAMYCIN    28 capsule    Take 1 capsule (100 mg) by mouth 2 times daily    Lyme disease       frovatriptan 2.5 MG tablet    FROVA    18 tablet    TAKE 1 TABLET BY MOUTH AT ONSET OF HEADACHE FOR MIGRAINE. MAY REPEAT DOSE IN 2 HOURS. DO NOT EXCEED 7.5MG IN 24 HOURS    Nonintractable migraine, unspecified migraine type       IBUPROFEN           silver sulfADIAZINE 1 % external cream    SILVADENE    30 g    Apply to procedure site lateral left great toe twice daily with dressing changes until healed.    Onychocryptosis       traZODone 50 MG tablet    DESYREL    180 tablet    Take 2 tablets (100 mg) by mouth nightly as needed for sleep    Primary insomnia

## 2018-12-06 NOTE — PROGRESS NOTES
SUBJECTIVE:   CC: Marisol Wang is an 58 year old woman who presents for preventive health visit.     Physical   Annual:     Getting at least 3 servings of Calcium per day:  Yes    Bi-annual eye exam:  NO    Dental care twice a year:  NO    Sleep apnea or symptoms of sleep apnea:  Excessive snoring    Diet:  Gluten-free/reduced    Frequency of exercise:  2-3 days/week    Duration of exercise:  15-30 minutes    Taking medications regularly:  Yes    Additional concerns today:  No    PHQ-2 Total Score: 1              Today's PHQ-2 Score:   PHQ-2 ( 1999 Pfizer) 12/6/2018   Q1: Little interest or pleasure in doing things 1   Q2: Feeling down, depressed or hopeless 0   PHQ-2 Score 1   Q1: Little interest or pleasure in doing things Several days   Q2: Feeling down, depressed or hopeless Not at all   PHQ-2 Score 1       Abuse: Current or Past(Physical, Sexual or Emotional)- No  Do you feel safe in your environment? Yes    Social History   Substance Use Topics     Smoking status: Never Smoker     Smokeless tobacco: Never Used     Alcohol use Yes      Comment: social - once every 2 wks     Alcohol Use 12/6/2018   If you drink alcohol do you typically have greater than 3 drinks per day OR greater than 7 drinks per week? No   No flowsheet data found.    Reviewed orders with patient.  Reviewed health maintenance and updated orders accordingly - Yes  BP Readings from Last 3 Encounters:   12/06/18 103/68   05/07/18 112/68   10/26/17 110/70    Wt Readings from Last 3 Encounters:   12/06/18 84 kg (185 lb 3.2 oz)   05/07/18 85.7 kg (189 lb)   10/26/17 85.8 kg (189 lb 3.2 oz)                  Current Outpatient Medications   Medication Sig Dispense Refill     citalopram (CELEXA) 20 MG tablet Take 1 tablet (20 mg) by mouth daily 90 tablet 3     frovatriptan (FROVA) 2.5 MG tablet TAKE 1 TABLET BY MOUTH AT ONSET OF HEADACHE FOR MIGRAINE. MAY REPEAT DOSE IN 2 HOURS. DO NOT EXCEED 7.5MG IN 24 HOURS 18 tablet 0     traZODone  (DESYREL) 50 MG tablet Take 2 tablets (100 mg) by mouth nightly as needed for sleep 180 tablet 3     acetaZOLAMIDE (DIAMOX) 125 MG tablet Take 1 tablet (125 mg) by mouth 2 times daily for 14 days 28 tablet 0     doxycycline (VIBRAMYCIN) 100 MG capsule Take 1 capsule (100 mg) by mouth 2 times daily (Patient not taking: Reported on 12/6/2018) 28 capsule 0     IBUPROFEN        silver sulfADIAZINE (SILVADENE) 1 % cream Apply to procedure site lateral left great toe twice daily with dressing changes until healed. (Patient not taking: Reported on 12/6/2018) 30 g 1     Recent Labs   Lab Test 10/26/17  1218 01/06/16  0753 04/19/13  0914 12/22/11  0822   LDL  --  89 84 94   HDL  --  49* 64 41*   TRIG  --  106 88 113   ALT 25 22 27 17   CR 0.73 0.81 0.81 0.71   GFRESTIMATED 83 74 75 88   GFRESTBLACK >90 89 >90 >90   POTASSIUM 4.3 3.9 5.2 4.2   TSH  --  1.56 1.18 1.83        Mammogram Screening: Patient over age 50, mutual decision to screen reflected in health maintenance.    Pertinent mammograms are reviewed under the imaging tab.  History of abnormal Pap smear: NO - age 30- 65 PAP every 3 years recommended  PAP / HPV Latest Ref Rng & Units 8/23/2016 5/14/2013 12/22/2011   PAP - NIL NIL ASC-US(A)   HPV 16 DNA NEG Negative - -   HPV 18 DNA NEG Negative - -   OTHER HR HPV NEG Negative - -     Reviewed and updated as needed this visit by clinical staff         Reviewed and updated as needed this visit by Provider            Review of Systems  CONSTITUTIONAL: NEGATIVE for fever, chills, change in weight  INTEGUMENTARY/SKIN: NEGATIVE for worrisome rashes, moles or lesions  EYES: NEGATIVE for vision changes or irritation  ENT: NEGATIVE for ear, mouth and throat problems  RESP: NEGATIVE for significant cough or SOB  BREAST: NEGATIVE for masses, tenderness or discharge  CV: NEGATIVE for chest pain, palpitations or peripheral edema  GI: NEGATIVE for nausea, abdominal pain, heartburn, or change in bowel habits  : NEGATIVE for  unusual urinary or vaginal symptoms. No vaginal bleeding.  MUSCULOSKELETAL: NEGATIVE for significant arthralgias or myalgia  NEURO: NEGATIVE for weakness, dizziness or paresthesias  PSYCHIATRIC: NEGATIVE for changes in mood or affect      OBJECTIVE:   There were no vitals taken for this visit.  Physical Exam  GENERAL APPEARANCE: healthy, alert and no distress  EYES: Eyes grossly normal to inspection, PERRL and conjunctivae and sclerae normal  HENT: ear canals and TM's normal, nose and mouth without ulcers or lesions, oropharynx clear and oral mucous membranes moist  NECK: no adenopathy, no asymmetry, masses, or scars and thyroid normal to palpation  RESP: lungs clear to auscultation - no rales, rhonchi or wheezes  BREAST: normal without masses, tenderness or nipple discharge and no palpable axillary masses or adenopathy  CV: regular rate and rhythm, normal S1 S2, no S3 or S4, no murmur, click or rub, no peripheral edema and peripheral pulses strong  ABDOMEN: soft, nontender, no hepatosplenomegaly, no masses and bowel sounds normal  MS: no musculoskeletal defects are noted and gait is age appropriate without ataxia  SKIN: no suspicious lesions or rashes  NEURO: Normal strength and tone, sensory exam grossly normal, mentation intact and speech normal  PSYCH: mentation appears normal and affect normal/bright    Diagnostic Test Results:  none     ASSESSMENT/PLAN:         3. Routine general medical examination at a health care facility      4. Menopausal syndrome (hot flashes)  Stable and doing well  - citalopram (CELEXA) 20 MG tablet; Take 1 tablet (20 mg) by mouth daily  Dispense: 90 tablet; Refill: 3    5. Primary insomnia    - traZODone (DESYREL) 50 MG tablet; Take 2 tablets (100 mg) by mouth nightly as needed for sleep  Dispense: 180 tablet; Refill: 3    6. Screening for diabetes mellitus    - Comprehensive metabolic panel; Future    7. Lipid screening    - Lipid panel reflex to direct LDL Fasting; Future    8.  "Screening for disorder of blood and blood-forming organs    - CBC with platelets; Future    9. Screening for thyroid disorder    - TSH with free T4 reflex; Future    10. Routine cervical smear    - Pap imaged thin layer screen with HPV - recommended age 30 - 65 years (select HPV order below)  - HPV High Risk Types DNA Cervical    COUNSELING:  Reviewed preventive health counseling, as reflected in patient instructions       Regular exercise       Healthy diet/nutrition       Vision screening       Hearing screening       Immunizations    Declined: Influenza due to Concerns about side effects/safety               Aspirin Prophylaxsis    BP Readings from Last 1 Encounters:   05/07/18 112/68     Estimated body mass index is 29.82 kg/(m^2) as calculated from the following:    Height as of 5/7/18: 5' 6.75\" (1.695 m).    Weight as of 5/7/18: 189 lb (85.7 kg).           reports that she has never smoked. She has never used smokeless tobacco.      Counseling Resources:  ATP IV Guidelines  Pooled Cohorts Equation Calculator  Breast Cancer Risk Calculator  FRAX Risk Assessment  ICSI Preventive Guidelines  Dietary Guidelines for Americans, 2010  [employer]'s MyPlate  ASA Prophylaxis  Lung CA Screening    Ramona Ann Aaseby-Aguilera, PA-C  [facility]    Patient Instructions   Fasting lab appt        Preventive Health Recommendations  Female Ages 50 - 64    Yearly exam: See your health care provider every year in order to  o Review health changes.   o Discuss preventive care.    o Review your medicines if your doctor has prescribed any.      Get a Pap test every three years (unless you have an abnormal result and your provider advises testing more often).    If you get Pap tests with HPV test, you only need to test every 5 years, unless you have an abnormal result.     You do not need a Pap test if your uterus was removed (hysterectomy) and you have not had cancer.    You should be tested each year for STDs (sexually " transmitted diseases) if you're at risk.     Have a mammogram every 1 to 2 years.    Have a colonoscopy at age 50, or have a yearly FIT test (stool test). These exams screen for colon cancer.      Have a cholesterol test every 5 years, or more often if advised.    Have a diabetes test (fasting glucose) every three years. If you are at risk for diabetes, you should have this test more often.     If you are at risk for osteoporosis (brittle bone disease), think about having a bone density scan (DEXA).    Shots: Get a flu shot each year. Get a tetanus shot every 10 years.    Nutrition:     Eat at least 5 servings of fruits and vegetables each day.    Eat whole-grain bread, whole-wheat pasta and brown rice instead of white grains and rice.    Get adequate Calcium and Vitamin D.     Lifestyle    Exercise at least 150 minutes a week (30 minutes a day, 5 days a week). This will help you control your weight and prevent disease.    Limit alcohol to one drink per day.    No smoking.     Wear sunscreen to prevent skin cancer.     See your dentist every six months for an exam and cleaning.    See your eye doctor every 1 to 2 years.

## 2018-12-06 NOTE — LETTER
January 2, 2019    Marisol Wang  [address]  [facility] [address]    Dear Marisol,  We are happy to inform you that your PAP smear result from 12/06/18 is normal.  We are now able to do a follow up test on PAP smears. The DNA test is for HPV (Human Papilloma Virus). Cervical cancer is closely linked with certain types of HPV. Your results showed no evidence of high risk HPV.  Therefore we recommend you return in 5 years for your next pap smear and HPV test.  You will still need to return to the clinic every year for an annual exam and other preventive tests.  If you have additional questions regarding this result, please call our registered nurse, Chely at [phone].  Sincerely,    Ramona Ann Aaseby-Aguilera, PA-C/[facility]

## 2018-12-06 NOTE — PATIENT INSTRUCTIONS
Fasting lab appt        Preventive Health Recommendations  Female Ages 50 - 64    Yearly exam: See your health care provider every year in order to  o Review health changes.   o Discuss preventive care.    o Review your medicines if your doctor has prescribed any.      Get a Pap test every three years (unless you have an abnormal result and your provider advises testing more often).    If you get Pap tests with HPV test, you only need to test every 5 years, unless you have an abnormal result.     You do not need a Pap test if your uterus was removed (hysterectomy) and you have not had cancer.    You should be tested each year for STDs (sexually transmitted diseases) if you're at risk.     Have a mammogram every 1 to 2 years.    Have a colonoscopy at age 50, or have a yearly FIT test (stool test). These exams screen for colon cancer.      Have a cholesterol test every 5 years, or more often if advised.    Have a diabetes test (fasting glucose) every three years. If you are at risk for diabetes, you should have this test more often.     If you are at risk for osteoporosis (brittle bone disease), think about having a bone density scan (DEXA).    Shots: Get a flu shot each year. Get a tetanus shot every 10 years.    Nutrition:     Eat at least 5 servings of fruits and vegetables each day.    Eat whole-grain bread, whole-wheat pasta and brown rice instead of white grains and rice.    Get adequate Calcium and Vitamin D.     Lifestyle    Exercise at least 150 minutes a week (30 minutes a day, 5 days a week). This will help you control your weight and prevent disease.    Limit alcohol to one drink per day.    No smoking.     Wear sunscreen to prevent skin cancer.     See your dentist every six months for an exam and cleaning.    See your eye doctor every 1 to 2 years.

## 2018-12-07 ASSESSMENT — ANXIETY QUESTIONNAIRES: GAD7 TOTAL SCORE: 4

## 2018-12-12 LAB
COPATH REPORT: NORMAL
PAP: NORMAL

## 2018-12-13 LAB
FINAL DIAGNOSIS: NORMAL
HPV HR 12 DNA CVX QL NAA+PROBE: NEGATIVE
HPV16 DNA SPEC QL NAA+PROBE: NEGATIVE
HPV18 DNA SPEC QL NAA+PROBE: NEGATIVE
SPECIMEN DESCRIPTION: NORMAL
SPECIMEN SOURCE CVX/VAG CYTO: NORMAL

## 2018-12-25 DIAGNOSIS — N95.1 MENOPAUSAL SYNDROME (HOT FLASHES): ICD-10-CM

## 2018-12-26 RX ORDER — CITALOPRAM HYDROBROMIDE 20 MG/1
TABLET ORAL
Qty: 30 TABLET | Refills: 0 | OUTPATIENT
Start: 2018-12-26

## 2019-04-05 ENCOUNTER — HOSPITAL ENCOUNTER (OUTPATIENT)
Dept: PHYSICAL MEDICINE AND REHAB | Facility: CLINIC | Age: 59
Discharge: HOME | End: 2019-04-05
Attending: PAIN MEDICINE

## 2019-04-05 ENCOUNTER — COMMUNICATION - HEALTHEAST (OUTPATIENT)
Dept: PHYSICAL MEDICINE AND REHAB | Facility: CLINIC | Age: 59
End: 2019-04-05

## 2019-04-05 ENCOUNTER — HOSPITAL ENCOUNTER (OUTPATIENT)
Dept: RADIOLOGY | Facility: CLINIC | Age: 59
Discharge: HOME | End: 2019-04-05

## 2019-04-05 DIAGNOSIS — M79.18 MYOFASCIAL PAIN: ICD-10-CM

## 2019-04-05 DIAGNOSIS — G44.209 TENSION HEADACHE: ICD-10-CM

## 2019-04-05 DIAGNOSIS — M79.644 THUMB PAIN, RIGHT: ICD-10-CM

## 2019-04-05 DIAGNOSIS — M54.2 CERVICALGIA: ICD-10-CM

## 2019-04-05 ASSESSMENT — MIFFLIN-ST. JEOR: SCORE: 1470.14

## 2019-04-08 ENCOUNTER — COMMUNICATION - HEALTHEAST (OUTPATIENT)
Dept: PHYSICAL MEDICINE AND REHAB | Facility: CLINIC | Age: 59
End: 2019-04-08

## 2019-04-09 ENCOUNTER — COMMUNICATION - HEALTHEAST (OUTPATIENT)
Dept: PHYSICAL MEDICINE AND REHAB | Facility: CLINIC | Age: 59
End: 2019-04-09

## 2019-04-15 ENCOUNTER — RECORDS - HEALTHEAST (OUTPATIENT)
Dept: ADMINISTRATIVE | Facility: OTHER | Age: 59
End: 2019-04-15

## 2019-04-22 ENCOUNTER — RECORDS - HEALTHEAST (OUTPATIENT)
Dept: ADMINISTRATIVE | Facility: OTHER | Age: 59
End: 2019-04-22

## 2019-04-24 ENCOUNTER — RECORDS - HEALTHEAST (OUTPATIENT)
Dept: ADMINISTRATIVE | Facility: OTHER | Age: 59
End: 2019-04-24

## 2019-04-30 ENCOUNTER — RECORDS - HEALTHEAST (OUTPATIENT)
Dept: ADMINISTRATIVE | Facility: OTHER | Age: 59
End: 2019-04-30

## 2019-05-06 ENCOUNTER — RECORDS - HEALTHEAST (OUTPATIENT)
Dept: ADMINISTRATIVE | Facility: OTHER | Age: 59
End: 2019-05-06

## 2019-05-09 ENCOUNTER — HOSPITAL ENCOUNTER (OUTPATIENT)
Dept: PHYSICAL MEDICINE AND REHAB | Facility: CLINIC | Age: 59
Discharge: HOME | End: 2019-05-09
Attending: PAIN MEDICINE

## 2019-05-09 DIAGNOSIS — M18.11 PRIMARY OSTEOARTHRITIS OF FIRST CARPOMETACARPAL JOINT OF RIGHT HAND: ICD-10-CM

## 2019-05-09 DIAGNOSIS — M79.18 MYOFASCIAL PAIN: ICD-10-CM

## 2019-05-09 DIAGNOSIS — M99.00 SOMATIC DYSFUNCTION OF HEAD REGION: ICD-10-CM

## 2019-05-09 DIAGNOSIS — M99.02 SOMATIC DYSFUNCTION OF SPINE, THORACIC: ICD-10-CM

## 2019-05-09 DIAGNOSIS — M47.812 CERVICAL SPONDYLOSIS WITHOUT MYELOPATHY: ICD-10-CM

## 2019-05-09 DIAGNOSIS — M99.08 SOMATIC DYSFUNCTION OF RIB CAGE REGION: ICD-10-CM

## 2019-05-09 DIAGNOSIS — M99.01 SOMATIC DYSFUNCTION OF SPINE, CERVICAL: ICD-10-CM

## 2019-05-09 DIAGNOSIS — G44.209 TENSION HEADACHE: ICD-10-CM

## 2019-05-20 ENCOUNTER — RECORDS - HEALTHEAST (OUTPATIENT)
Dept: ADMINISTRATIVE | Facility: OTHER | Age: 59
End: 2019-05-20

## 2019-05-28 ENCOUNTER — RECORDS - HEALTHEAST (OUTPATIENT)
Dept: ADMINISTRATIVE | Facility: OTHER | Age: 59
End: 2019-05-28

## 2019-05-29 ENCOUNTER — HOSPITAL ENCOUNTER (OUTPATIENT)
Dept: PHYSICAL MEDICINE AND REHAB | Facility: CLINIC | Age: 59
Discharge: HOME | End: 2019-05-29
Attending: PAIN MEDICINE

## 2019-05-29 DIAGNOSIS — M76.31 IT BAND SYNDROME, RIGHT: ICD-10-CM

## 2019-05-29 DIAGNOSIS — M18.11 PRIMARY OSTEOARTHRITIS OF FIRST CARPOMETACARPAL JOINT OF RIGHT HAND: ICD-10-CM

## 2019-05-29 DIAGNOSIS — G44.209 TENSION HEADACHE: ICD-10-CM

## 2019-05-29 DIAGNOSIS — M25.562 CHRONIC PAIN OF LEFT KNEE: ICD-10-CM

## 2019-05-29 DIAGNOSIS — M79.18 MYOFASCIAL PAIN: ICD-10-CM

## 2019-05-29 DIAGNOSIS — G89.29 CHRONIC PAIN OF LEFT KNEE: ICD-10-CM

## 2019-05-29 DIAGNOSIS — M76.32 IT BAND SYNDROME, LEFT: ICD-10-CM

## 2019-05-29 DIAGNOSIS — M47.812 CERVICAL SPONDYLOSIS WITHOUT MYELOPATHY: ICD-10-CM

## 2019-10-02 ENCOUNTER — HEALTH MAINTENANCE LETTER (OUTPATIENT)
Age: 59
End: 2019-10-02

## 2019-12-28 DIAGNOSIS — F51.01 PRIMARY INSOMNIA: ICD-10-CM

## 2019-12-30 RX ORDER — TRAZODONE HYDROCHLORIDE 50 MG/1
TABLET, FILM COATED ORAL
Qty: 60 TABLET | Refills: 0 | Status: SHIPPED | OUTPATIENT
Start: 2019-12-30 | End: 2020-01-27

## 2019-12-30 NOTE — TELEPHONE ENCOUNTER
Prescription approved per [facility].    For 30 day fill due for yearly     Lisa Stoll RN

## 2020-01-02 DIAGNOSIS — N95.1 MENOPAUSAL SYNDROME (HOT FLASHES): ICD-10-CM

## 2020-01-02 RX ORDER — CITALOPRAM HYDROBROMIDE 20 MG/1
TABLET ORAL
Qty: 90 TABLET | Refills: 0 | Status: SHIPPED | OUTPATIENT
Start: 2020-01-02 | End: 2020-01-27

## 2020-01-02 NOTE — TELEPHONE ENCOUNTER
Medication is being filled for 1 time refill only due to:  Patient needs to be seen because it has been more than one year since last visit.    Latoya Ramon RN, BSN

## 2020-01-27 ENCOUNTER — OFFICE VISIT (OUTPATIENT)
Dept: FAMILY MEDICINE | Facility: CLINIC | Age: 60
End: 2020-01-27
Payer: COMMERCIAL

## 2020-01-27 VITALS
BODY MASS INDEX: 29.27 KG/M2 | TEMPERATURE: 98.3 F | HEIGHT: 67 IN | OXYGEN SATURATION: 97 % | DIASTOLIC BLOOD PRESSURE: 80 MMHG | SYSTOLIC BLOOD PRESSURE: 127 MMHG | WEIGHT: 186.5 LBS | HEART RATE: 66 BPM

## 2020-01-27 DIAGNOSIS — Z13.220 LIPID SCREENING: ICD-10-CM

## 2020-01-27 DIAGNOSIS — Z13.1 SCREENING FOR DIABETES MELLITUS: ICD-10-CM

## 2020-01-27 DIAGNOSIS — Z13.0 SCREENING FOR DISORDER OF BLOOD AND BLOOD-FORMING ORGANS: ICD-10-CM

## 2020-01-27 DIAGNOSIS — F51.01 PRIMARY INSOMNIA: ICD-10-CM

## 2020-01-27 DIAGNOSIS — Z00.00 ROUTINE GENERAL MEDICAL EXAMINATION AT A HEALTH CARE FACILITY: Primary | ICD-10-CM

## 2020-01-27 DIAGNOSIS — N95.1 MENOPAUSAL SYNDROME (HOT FLASHES): ICD-10-CM

## 2020-01-27 DIAGNOSIS — Z63.4 BEREAVEMENT, UNCOMPLICATED: ICD-10-CM

## 2020-01-27 DIAGNOSIS — G43.009 NONINTRACTABLE MIGRAINE, UNSPECIFIED MIGRAINE TYPE: ICD-10-CM

## 2020-01-27 PROCEDURE — 99396 PREV VISIT EST AGE 40-64: CPT | Performed by: PHYSICIAN ASSISTANT

## 2020-01-27 RX ORDER — CITALOPRAM HYDROBROMIDE 20 MG/1
20 TABLET ORAL DAILY
Qty: 90 TABLET | Refills: 3 | Status: SHIPPED | OUTPATIENT
Start: 2020-01-27 | End: 2021-02-15

## 2020-01-27 RX ORDER — FROVATRIPTAN SUCCINATE 2.5 MG/1
TABLET, FILM COATED ORAL
Qty: 18 TABLET | Refills: 0 | Status: SHIPPED | OUTPATIENT
Start: 2020-01-27 | End: 2020-12-30

## 2020-01-27 RX ORDER — TRAZODONE HYDROCHLORIDE 50 MG/1
TABLET, FILM COATED ORAL
Qty: 180 TABLET | Refills: 3 | Status: SHIPPED | OUTPATIENT
Start: 2020-01-27 | End: 2021-02-15

## 2020-01-27 SDOH — SOCIAL STABILITY - SOCIAL INSECURITY: DISSAPEARANCE AND DEATH OF FAMILY MEMBER: Z63.4

## 2020-01-27 ASSESSMENT — ENCOUNTER SYMPTOMS
EYE PAIN: 0
NERVOUS/ANXIOUS: 0
COUGH: 0
JOINT SWELLING: 0
NAUSEA: 0
HEMATURIA: 0
DIARRHEA: 0
ABDOMINAL PAIN: 0
PALPITATIONS: 0
DYSURIA: 0
ARTHRALGIAS: 0
SHORTNESS OF BREATH: 0
MYALGIAS: 0
SORE THROAT: 0
CONSTIPATION: 0
WEAKNESS: 0
HEADACHES: 1
PARESTHESIAS: 0
HEARTBURN: 0
CHILLS: 0
DIZZINESS: 0
FREQUENCY: 0
HEMATOCHEZIA: 0

## 2020-01-27 ASSESSMENT — MIFFLIN-ST. JEOR: SCORE: 1458.59

## 2020-01-27 NOTE — PROGRESS NOTES
SUBJECTIVE:   CC: Marisol Wang is an 60 year old woman who presents for preventive health visit.     Healthy Habits:     Getting at least 3 servings of Calcium per day:  Yes    Bi-annual eye exam:  Yes    Dental care twice a year:  Yes    Sleep apnea or symptoms of sleep apnea:  Excessive snoring    Diet:  Gluten-free/reduced    Frequency of exercise:  2-3 days/week    Duration of exercise:  45-60 minutes    Taking medications regularly:  Yes    Medication side effects:  None    PHQ-2 Total Score: 1    Additional concerns today:  Yes          stoney lost her mom     Today's PHQ-2 Score:   PHQ-2 ( 1999 [employer]) 1/27/2020   Q1: Little interest or pleasure in doing things 1   Q2: Feeling down, depressed or hopeless 0   PHQ-2 Score 1   Q1: Little interest or pleasure in doing things Several days   Q2: Feeling down, depressed or hopeless Not at all   PHQ-2 Score 1       Abuse: Current or Past(Physical, Sexual or Emotional)- No  Do you feel safe in your environment? Yes    Have you ever done Advance Care Planning? (For example, a Health Directive, POLST, or a discussion with a medical provider or your loved ones about your wishes): maybe    Social History     Tobacco Use     Smoking status: Never Smoker     Smokeless tobacco: Never Used   Substance Use Topics     Alcohol use: Yes     Comment: social - once every 2 wks         Alcohol Use 1/27/2020   Prescreen: >3 drinks/day or >7 drinks/week? No   Prescreen: >3 drinks/day or >7 drinks/week? -       Reviewed orders with patient.  Reviewed health maintenance and updated orders accordingly - Yes  BP Readings from Last 3 Encounters:   01/27/20 127/80   12/06/18 103/68   05/07/18 112/68    Wt Readings from Last 3 Encounters:   01/27/20 84.6 kg (186 lb 8 oz)   12/06/18 84 kg (185 lb 3.2 oz)   05/07/18 85.7 kg (189 lb)                  Recent Labs   Lab Test 10/26/17  1218 01/06/16  0753 04/19/13  0914 12/22/11  0822   LDL  --  89 84 94   HDL  --  49* 64 41*   TRIG  --   106 88 113   ALT 25 22 27 17   CR 0.73 0.81 0.81 0.71   GFRESTIMATED 83 74 75 88   GFRESTBLACK >90 89 >90 >90   POTASSIUM 4.3 3.9 5.2 4.2   TSH  --  1.56 1.18 1.83        Mammogram Screening: Patient over age 50, mutual decision to screen reflected in health maintenance.    Pertinent mammograms are reviewed under the imaging tab.  History of abnormal Pap smear: NO - age 30- 65 PAP every 3 years recommended  PAP / HPV Latest Ref Rng & Units 12/6/2018 8/23/2016 5/14/2013   PAP - NIL NIL NIL   HPV 16 DNA NEG:Negative Negative Negative -   HPV 18 DNA NEG:Negative Negative Negative -   OTHER HR HPV NEG:Negative Negative Negative -     Reviewed and updated as needed this visit by clinical staff         Reviewed and updated as needed this visit by Provider            Review of Systems  CONSTITUTIONAL: NEGATIVE for fever, chills, change in weight  INTEGUMENTARY/SKIN: NEGATIVE for worrisome rashes, moles or lesions  EYES: NEGATIVE for vision changes or irritation  ENT: NEGATIVE for ear, mouth and throat problems  RESP: NEGATIVE for significant cough or SOB  BREAST: NEGATIVE for masses, tenderness or discharge  CV: NEGATIVE for chest pain, palpitations or peripheral edema  GI: NEGATIVE for nausea, abdominal pain, heartburn, or change in bowel habits  : NEGATIVE for unusual urinary or vaginal symptoms. No vaginal bleeding.  MUSCULOSKELETAL: NEGATIVE for significant arthralgias or myalgia  NEURO: NEGATIVE for weakness, dizziness or paresthesias  PSYCHIATRIC: NEGATIVE for changes in mood or affect      OBJECTIVE:   There were no vitals taken for this visit.  Physical Exam  GENERAL: healthy, alert and no distress  EYES: Eyes grossly normal to inspection, PERRL and conjunctivae and sclerae normal  HENT: ear canals and TM's normal, nose and mouth without ulcers or lesions  NECK: no adenopathy, no asymmetry, masses, or scars and thyroid normal to palpation  RESP: lungs clear to auscultation - no rales, rhonchi or wheezes  BREAST:  "normal without masses, tenderness or nipple discharge and no palpable axillary masses or adenopathy  CV: regular rate and rhythm, normal S1 S2, no S3 or S4, no murmur, click or rub, no peripheral edema and peripheral pulses strong  ABDOMEN: soft, nontender, no hepatosplenomegaly, no masses and bowel sounds normal  MS: no gross musculoskeletal defects noted, no edema  SKIN: no suspicious lesions or rashes  NEURO: Normal strength and tone, mentation intact and speech normal  PSYCH: mentation appears normal, affect normal/bright  LYMPH: no cervical, supraclavicular, axillary, or inguinal adenopathy    Diagnostic Test Results:  Labs reviewed in Epic    ASSESSMENT/PLAN:   1. Routine general medical examination at a health care facility      2. Screening for diabetes mellitus    - Comprehensive metabolic panel; Future    3. Lipid screening    - Lipid panel reflex to direct LDL Fasting; Future    4. Screening for disorder of blood and blood-forming organs    - CBC with platelets; Future    5. Menopausal syndrome (hot flashes)    - citalopram (CELEXA) 20 MG tablet; Take 1 tablet (20 mg) by mouth daily  Dispense: 90 tablet; Refill: 3    6. Nonintractable migraine, unspecified migraine type    - frovatriptan (FROVA) 2.5 MG tablet; TAKE 1 TABLET BY MOUTH AT ONSET OF HEADACHE FOR MIGRAINE. MAY REPEAT DOSE IN 2 HOURS. DO NOT EXCEED 7.5MG IN 24 HOURS  Dispense: 18 tablet; Refill: 0    7. Primary insomnia    - traZODone (DESYREL) 50 MG tablet; TAKE 2 TABLETS BY MOUTH EVERY EVENING AS NEEDED FOR SLEEP  Dispense: 180 tablet; Refill: 3    COUNSELING:  Reviewed preventive health counseling, as reflected in patient instructions       Regular exercise       Healthy diet/nutrition       Vision screening       Hearing screening       Aspirin Prophylaxsis    Estimated body mass index is 29.01 kg/m  as calculated from the following:    Height as of 12/6/18: 1.702 m (5' 7\").    Weight as of 12/6/18: 84 kg (185 lb 3.2 oz).         reports " that she has never smoked. She has never used smokeless tobacco.      Counseling Resources:  ATP IV Guidelines  Pooled Cohorts Equation Calculator  Breast Cancer Risk Calculator  FRAX Risk Assessment  ICSI Preventive Guidelines  Dietary Guidelines for Americans, 2010  USDA's MyPlate  ASA Prophylaxis  Lung CA Screening    Ramona Ann Aaseby-Aguilera, PA-C  [facility]

## 2020-01-29 DIAGNOSIS — F51.01 PRIMARY INSOMNIA: ICD-10-CM

## 2020-01-29 RX ORDER — TRAZODONE HYDROCHLORIDE 50 MG/1
TABLET, FILM COATED ORAL
Qty: 60 TABLET | OUTPATIENT
Start: 2020-01-29

## 2020-01-30 ENCOUNTER — TELEPHONE (OUTPATIENT)
Dept: FAMILY MEDICINE | Facility: CLINIC | Age: 60
End: 2020-01-30

## 2020-01-30 NOTE — TELEPHONE ENCOUNTER
Panel Management Review      Patient has the following on her problem list: None      Composite cancer screening  Chart review shows that this patient is due/due soon for the following Mammogram  Summary:    Patient is due/failing the following:   MAMMOGRAM    Action needed:   None at this time as pt just had px yesterday                                                                                        Chuy Eller [facility]           Chart routed to none .

## 2020-03-11 ENCOUNTER — TELEPHONE (OUTPATIENT)
Dept: FAMILY MEDICINE | Facility: CLINIC | Age: 60
End: 2020-03-11

## 2020-03-11 NOTE — TELEPHONE ENCOUNTER
Panel Management Review      Patient has the following on her problem list: None      Composite cancer screening  Chart review shows that this patient is due/due soon for the following Mammogram  Summary:    Patient is due/failing the following:   MAMMOGRAM    Action needed:   Patient needs referral/order: mammogram.     Type of outreach:    None, routed to provider for review.    Questions for provider review:    Pt here in January for px. Did not see discussion or order for mammogram. If pt due please order.                                                                                                                                     Chuy Eller [facility]           Chart routed to Provider .

## 2020-12-30 DIAGNOSIS — G43.009 NONINTRACTABLE MIGRAINE, UNSPECIFIED MIGRAINE TYPE: ICD-10-CM

## 2020-12-30 RX ORDER — FROVATRIPTAN SUCCINATE 2.5 MG/1
TABLET, FILM COATED ORAL
Qty: 18 TABLET | Refills: 0 | Status: SHIPPED | OUTPATIENT
Start: 2020-12-30 | End: 2021-09-29

## 2020-12-30 NOTE — TELEPHONE ENCOUNTER
Prescription approved per [facility] Refill Protocol.  For 1 X fill due for yearly     Lisa Stoll RN

## 2021-01-15 ENCOUNTER — HEALTH MAINTENANCE LETTER (OUTPATIENT)
Age: 61
End: 2021-01-15

## 2021-02-13 DIAGNOSIS — N95.1 MENOPAUSAL SYNDROME (HOT FLASHES): ICD-10-CM

## 2021-02-13 DIAGNOSIS — F51.01 PRIMARY INSOMNIA: ICD-10-CM

## 2021-02-15 NOTE — TELEPHONE ENCOUNTER
Routing refill request to provider for review/approval because:  Drug interaction warning and pt is over due for f/u     LOV 1/27/20      LM to call and schedule ok for 30 day fill?    Lisa Stoll, RN

## 2021-02-16 RX ORDER — TRAZODONE HYDROCHLORIDE 50 MG/1
TABLET, FILM COATED ORAL
Qty: 60 TABLET | Refills: 0 | Status: SHIPPED | OUTPATIENT
Start: 2021-02-16 | End: 2021-09-29

## 2021-02-16 RX ORDER — CITALOPRAM HYDROBROMIDE 20 MG/1
TABLET ORAL
Qty: 30 TABLET | Refills: 0 | Status: SHIPPED | OUTPATIENT
Start: 2021-02-16 | End: 2021-09-29

## 2021-03-21 ENCOUNTER — HEALTH MAINTENANCE LETTER (OUTPATIENT)
Age: 61
End: 2021-03-21

## 2021-05-27 NOTE — PATIENT INSTRUCTIONS - HE
Discussed the importance of core strengthening, ROM, stretching exercises with the patient and how each of these entities is important in decreasing pain.  Explained to the patient that the purpose of physical therapy is to teach the patient a home exercise program.  These exercises need to be performed every day in order to decrease pain and prevent future occurrences of pain.        I have ordered occupational therapy and physical therapy for you.    I have ordered x-rays of your neck and right thumb.  Once I reviewed the images I will have 1 of our nurses give you a call with results and recommendations.    ~Please call Nurse Navigation line [phone] with any questions or concerns about your treatment plan, if symptoms worsen and you would like to be seen urgently, or if you have problems controlling bladder and bowel function.

## 2021-05-27 NOTE — TELEPHONE ENCOUNTER
----- Message from Yuan Butler DO sent at 4/5/2019  2:48 PM CDT -----  Could you please call the patient let her know that I have reviewed the x-ray of her right thumb.  It does show mild degenerative changes in the first CMC joint or thumb joint.  This is likely the cause of your right thumb pain.    Recommendations: Recommend that you start occupational therapy for your right thumb pain and follow-up as scheduled.

## 2021-05-27 NOTE — TELEPHONE ENCOUNTER
Call from pt requesting PT/OT referral be faxed to [facility] in [place], [place]. Pt states Arabella Reyes is not covered by her insurance.     Referrals faxed to #[phone]

## 2021-05-27 NOTE — TELEPHONE ENCOUNTER
----- Message from Yuan Butler, DO sent at 4/8/2019  7:24 AM CDT -----  Could you please call the patient let her know that I have reviewed the x-ray of her neck?  It does show some wear and tear arthritis, however this is minimal.  Overall x-ray looks good.  We can review x-ray when she comes back for follow-up.    Recommendations: Recommend the patient start and continue physical therapy and follow-up as scheduled.

## 2021-05-27 NOTE — TELEPHONE ENCOUNTER
Call to pt with provider's results and recommendations. Pt verbalized understanding. She will go to OT and follow-up in 4 weeks with provider.

## 2021-05-27 NOTE — PROGRESS NOTES
ASSESSMENT: Marisol Wang is a 59 y.o. female presents for consultation at the request of Clinic, [facility], with past medical history significant for anxiety, chondromalacia patella, knee pain, migraine, osteoarthritis of knee, who presents today for new patient evaluation of left-sided neck pain and right thumb pain:     -Overall the patient's physical exam is very reassuring that she has normal strength.  Pain is likely secondary to myofascial type pain in her neck as well as possible facet joint arthritis.  Pain in her right thumb is likely secondary to right CMC thumb arthritis.    Patient is neurologically intact on exam. No myelopathic or red flag symptoms.      NDI Score: 36    WHO 5: 16     Diagnoses and all orders for this visit:    Cervicalgia  -     XR Cervical Spine 2 - 3 VWS  -     Ambulatory referral to Physical Therapy    Myofascial pain  -     XR Cervical Spine 2 - 3 VWS  -     Ambulatory referral to Physical Therapy    Thumb pain, right  -     XR Finger Right 2 or More VWS  -     Ambulatory referral to Occupational Therapy    Tension headache       PLAN:  Reviewed spine anatomy and disease process. Discussed diagnosis and treatment options with the patient today. A shared decision making model was used. The patient's values and choices were respected. The following represents what was discussed and decided upon by the provider and the patient.     -DIAGNOSTIC TESTS:    --I do not have any images at this time.  --I have ordered x-rays of her neck and right thumb to investigate further.  Once I reviewed the images I will have 1 of our nurses give the patient a call with results and recommendations.    -PHYSICAL THERAPY: I have ordered physical therapy and occupational therapy at the [facility] location per patient's request for convenience of proximity to work on neck pain and right thumb pain.  I like them to give her an exercise program that she can use on a  daily basis.    Discussed the importance of core strengthening, ROM, stretching exercises with the patient and how each of these entities is important in decreasing pain.  Explained to the patient that the purpose of physical therapy is to teach the patient a home exercise program.  These exercises need to be performed every day in order to decrease pain and prevent future occurrences of pain.  Likened it to brushing one's teeth.      -MEDICATIONS: It is fine for you to continue taking ibuprofen as needed.  -  Discussed multiple medication options today with patient. Discussed risks, side effects, and proper use of medications. Patient verbalized understanding.    -INTERVENTIONS: No interventions at this time.  Discussed risks and benefits of injections with patient today.    -PATIENT EDUCATION: We discussed the importance of a multimodal approach to pain including medication management, physical therapy, occupational therapy, possible injections in the future.  We also discussed reasoning for imaging.    -FOLLOW-UP:   Patient will follow-up in 4 weeks.    Advised patient to call the [facility] if symptoms worsen or you have problems controlling bladder and bowel function.   ______________________________________________________________________    SUBJECTIVE:   Marisol Wang  is a 59 y.o. female who presents today for new patient evaluation of neck pain on left side.  Patient reports that pain began in August 2018 without incident of trauma or injury.  She has not had any previous spinal surgery nor any treatments.  She also has headaches with this.  She reports that the pain is in her left neck, trapezial area and interscapular area.  Pain will also go up into the occiput.  Pain is achy in nature.  There are days that she is not having much pain, however most the time she is.  She has been trying to work on her posture and finds that this is helpful.  Pain is also improved with ibuprofen.  Pain is  worsened with sitting in chairs where she is not able to sit up straight.  He is also worsened when she is looking down when she is studying.  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  She has been changing where she is working on her schoolwork from where it used to be on her bed to a couch now to a chair and table.  When she is looking at her laptop her head is bent forward.  Pain is also worse when she is lifting her granddaughter.  She denies any radiation of pain down her arms.  Any bowel or bladder changes, fevers, chills, unintentional weight loss.  Her pain today is 3/10 at its worst is 6/10 at its best a 0/10.    -Treatment to Date: None to date    -Medications:    Current Outpatient Medications on File Prior to Encounter   Medication Sig Dispense Refill     citalopram (CELEXA) 20 MG tablet Take 20 mg by mouth.       frovatriptan (FROVA) 2.5 MG tablet TAKE 1 TABLET BY MOUTH AT ONSET OF HEADACHE FOR MIGRAINE. MAY REPEAT DOSE IN 2 HOURS. DO NOT EXCEED 7.5MG IN 24 HOURS       ibuprofen 200 mg cap Take 600 mg by mouth every 6 (six) hours as needed.       PROGESTERONE MISC Use As Directed.       traZODone (DESYREL) 50 MG tablet Take 100 mg by mouth.       No current facility-administered medications on file prior to encounter.        No Known Allergies    Past Medical History:   Diagnosis Date     Anxiety         Patient Active Problem List   Diagnosis     Anxiety     Chondromalacia patellae     Knee pain     Migraine     Osteoarthritis of knee       No past surgical history on file.    No family history on file.    Reviewed past medical, surgical, and family history with patient found on new patient intake packet located in [employer] tab.     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  She denies any smoking, using recreational drugs.  She reports drinking alcohol once every couple  "weeks.    ROS: Specifically negative for bowel/bladder dysfunction, balance changes, dizziness, foot drop, fevers, chills, appetite changes, nausea/vomiting, unexplained weight loss. Otherwise 13 systems reviewed are negative. Please see the patient's intake questionnaire from today for details.    OBJECTIVE:  /65 (Patient Site: Left Arm, Patient Position: Sitting, Cuff Size: Adult Large)   Pulse 62   Temp 98.2  F (36.8  C) (Oral)   Resp 18   Ht 5' 7.5\" (1.715 m)   Wt 188 lb 6.4 oz (85.5 kg)   SpO2 98%   BMI 29.07 kg/m      PHYSICAL EXAMINATION:  --CONSTITUTIONAL: Vital signs as above. No acute distress. The patient is well nourished and well groomed.  --PSYCHIATRIC: The patient is awake, alert, oriented to person, place, time and answering questions appropriately with clear speech. Appropriate mood and affect   --HEENT: Sclera are non-injected.   --SKIN: Skin over the face, bilateral upper extremities, and posterior torso is clean, dry, intact without rashes.  --RESPIRATORY: Normal rhythm and effort. No abnormal accessory muscle breathing patterns noted.   --GROSS MOTOR: Easily arises from a seated position. Toe walking and heel walking are normal.    --CERVICAL SPINE: Inspection reveals no evidence of deformity. Range of motion is not limited in cervical flexion, extension, lateral rotation.  She has tenderness palpation along the left cervical paraspinal muscles into her trapezius and intrascapular muscles.  Spurling maneuver negative bilaterally.  She has tenderness palpation over the right CMC joint of thumb.  --SHOULDERS: Full range of motion bilaterally. Negative empty can.  --UPPER EXTREMITY MOTOR TESTING:  Wrist flexion left 5/5, right 5/5  Wrist extension left 5/5, right 5/5  Pronators left 5/5, right 5/5  Biceps left 5/5, right 5/5   Triceps left 5/5, right 5/5   Shoulder abduction left 5/5, right 5/5   left 5/5, right 5/5  --NEUROLOGIC: 2/4 symmetric biceps, brachioradialis, triceps " reflexes bilaterally. Sensation to upper extremities is normal.  Negative Amaya's bilaterally.    --VASCULAR: 2/4 radial pulses bilaterally. Warm upper limbs bilaterally.

## 2021-05-28 NOTE — PROGRESS NOTES
Assessment:     Diagnoses and all orders for this visit:    Myofascial pain    Tension headache    Cervical spondylosis without myelopathy    Primary osteoarthritis of first carpometacarpal joint of right hand    Somatic dysfunction of head region    Somatic dysfunction of spine, cervical    Somatic dysfunction of rib cage region    Somatic dysfunction of spine, thoracic       Marisol Wang is a 59 y.o. y.o. female with past medical history significant for anxiety, chondromalacia patella, knee pain, migraine, osteoarthritis of knee who presents today for follow-up regarding left-sided neck pain and right thumb pain:     -Patient reports that neck pain and right thumb pain have improved with physical therapy and occupational therapy.  Thumb splint has been helpful.  Pain is likely secondary to myofascial pain in her cervical and thoracic region.  Her right thumb pain is likely secondary to her osteoarthritis in the first CMC joint.    Plan:     A shared decision making plan was used.  The patient's values and choices were respected. Prior medical records from our last visit on 4/5/2019 were reviewed today. The following represents what was discussed and decided upon by the provider and the patient.     -DIAGNOSTIC TESTS: Images were personally reviewed and interpreted.   --X-ray of the cervical spine dated 4/5/2019 is personally reviewed and images interpreted and shown to the patient.  There is a 1.5 mm retrolisthesis of C4 on C5 and a 2.5 mm retrolisthesis of C5 on C6.  There is moderate loss of disc height space between C4-5 and C5-6.   --X-ray of her right thumb dated 4/5/2019 is personally reviewed and images interpreted and shown to the patient.  It does show osteoarthritis of the first CMC joint of the right thumb.    -INTERVENTIONS: Osteopathic manipulation was done today.  Patient tolerated the procedure well.  Please see the procedure note below.    -MEDICATIONS: No change to  medications.  -  Discussed side effects of medications and proper use. Patient verbalized understanding.    -PHYSICAL THERAPY: The patient has had 3 sessions of physical therapy and one session of occupational therapy.  I recommended she continue with both.    -PATIENT EDUCATION: We discussed treatment of pain in a multimodal fashion including physical therapy, occupational therapy, medication management and osteopathic manipulation.  We also briefly discussed injections.    -FOLLOW UP: Patient will follow-up in 2 to 3 weeks for a possible OMT treatment.  Advised to contact clinic if symptoms worsen or change.    Subjective:     Marisol Wang is a 59 y.o. female who presents today for follow-up regarding neck and right thumb pain.  Patient reports that her neck pain has improved.  She is currently going to physical therapy and has had 3 sessions.  She finds that it is helpful.  She is now having more generalized neck pain as she is strengthening muscles and not having the sharp pains as often in her neck.  Her pain today is 3/10 at its best a 0/10 at its worst is 8/10.  She is also been occupational therapy for 1 session and her therapist made her to thumb spica splints which she finds are very helpful.  She wears them at night and notices throughout the day if she is using her thumb.  She is wearing it more during the day now and finds that it is helpful to give feedback of how much she uses her thumb.  She indicated that she was using her thumb often to scroll on her phone and with the brace is not able to do this.  She describes the pain in her neck and thumb is achy in nature.  She will take ibuprofen 600 mg occasionally.  She is also been able to be more active since starting physical therapy and despite this she has continued to feel better than when I previously saw her.    No myelopathic symptoms.     Evaluation to Date: X-ray of the cervical spine dated 4/5/2019.    Treatment to Date: 3 sessions of  physical therapy one session of occupational therapy and one session of OMT.    Patient Active Problem List   Diagnosis     Anxiety     Chondromalacia patellae     Knee pain     Migraine     Osteoarthritis of knee       Current Outpatient Medications on File Prior to Encounter   Medication Sig Dispense Refill     citalopram (CELEXA) 20 MG tablet Take 20 mg by mouth.       ibuprofen 200 mg cap Take 600 mg by mouth every 6 (six) hours as needed.       PROGESTERONE MISC Use As Directed.       traZODone (DESYREL) 50 MG tablet Take 100 mg by mouth.       frovatriptan (FROVA) 2.5 MG tablet TAKE 1 TABLET BY MOUTH AT ONSET OF HEADACHE FOR MIGRAINE. MAY REPEAT DOSE IN 2 HOURS. DO NOT EXCEED 7.5MG IN 24 HOURS       No current facility-administered medications on file prior to encounter.        No Known Allergies    Past Medical History:   Diagnosis Date     Anxiety         Review of Systems  ROS: Specifically negative for bowel/bladder dysfunction, balance changes, dizziness, foot drop, fevers, chills, appetite changes, nausea/vomiting, unexplained weight loss. Otherwise 13 systems reviewed are negative. Please see the patient's intake questionnaire from today for details.    Reviewed Social, Family, Past Medical and Past Surgical history with patient, no significant changes noted since prior visit.     Objective:     /80 (Patient Site: Right Arm, Patient Position: Sitting, Cuff Size: Adult Regular)   Pulse 61   Temp 97.5  F (36.4  C) (Oral)   Resp 18   Wt 189 lb 3.2 oz (85.8 kg)   SpO2 97%   BMI 29.20 kg/m      PHYSICAL EXAMINATION:   --CONSTITUTIONAL: Vital signs as above. No acute distress. The patient is well nourished and well groomed.  --PSYCHIATRIC:  The patient is awake, alert, oriented to person, place, time and answering questions appropriately with clear speech. Appropriate mood and affect   --HEENT: Sclera are non-injected.   --SKIN: Skin over the face, bilateral upper extremities, and posterior torso  is clean, dry, intact without rashes.  --RESPIRATORY: Normal rhythm and effort. No abnormal accessory muscle breathing patterns noted.   --GROSS MOTOR: Easily arises from a seated position.   --CERVICAL SPINE: Inspection reveals no evidence of deformity. Range of motion is not limited in cervical flexion, extension, lateral rotation.  She has tenderness to palpation along the cervical paraspinal muscles.  --UPPER EXTREMITY MOTOR TESTING:  Normal strength of bilateral upper extremities.  --NEUROLOGIC: Sensation to upper extremities is intact. Negative Amaya's bilaterally.   --VASCULAR: Warm upper limbs bilaterally.     Imaging of the cervical spine: Cervical spine and right hand imaging was reviewed today. The images were shown to the patient and the findings were explained using a spine model.    OSTEOPATHIC STRUCTURAL EXAM:  Head: OA is rotated right side bent left  Cervical: C4-5 rotated right side bent right  Ribs: Elevated first right rib    OMT was performed today.  The patient tolerated the procedure well.  Patient was instructed to drink plenty of water.  As discussed that the patient could have increased soreness after the treatment, soreness like after a workout.  Pre-pain score 3/10 post pain score 1/10.    OMT:  TREATMENTS IN PARENTHESES  Head: Muscle energy  Cervical: Muscle energy and myofascial release  Ribs: Muscle energy  Thoracic: Muscle energy

## 2021-05-28 NOTE — PATIENT INSTRUCTIONS - HE
Osteopathic manipulation was done today.  You may feel sense of soreness like a workout type soreness.  Please drink plenty of water today.    I recommend that you continue with physical therapy and Occupational Therapy.    ~Please call Nurse Navigation line [phone] with any questions or concerns about your treatment plan, if symptoms worsen and you would like to be seen urgently, or if you have problems controlling bladder and bowel function.

## 2021-05-29 NOTE — PATIENT INSTRUCTIONS - HE
Please continue doing your physical therapy exercises for your neck.  I have added in a prescription for therapy for your low back, hips, and left knee.    ~Please call Nurse Navigation line [phone] with any questions or concerns about your treatment plan, if symptoms worsen and you would like to be seen urgently, or if you have problems controlling bladder and bowel function.

## 2021-05-29 NOTE — PROGRESS NOTES
Assessment:     Diagnoses and all orders for this visit:    Myofascial pain    Tension headache    Cervical spondylosis without myelopathy    Primary osteoarthritis of first carpometacarpal joint of right hand    It band syndrome, left  -     Ambulatory referral to Physical Therapy    It band syndrome, right  -     Ambulatory referral to Physical Therapy    Chronic pain of left knee  -     Ambulatory referral to Physical Therapy       Marisol Wang is a 59 y.o. y.o. female with past medical history significant for anxiety, chondromalacia patella, knee pain, migraine, osteoarthritis of knee who presents today for follow-up regarding left-sided neck pain and right thumb pain as well as osteopathic manipulation was done on 5/9/2019:     -Patient's neck pain is much better.  Right thumb pain is worsening.  This is likely secondary to first CMC arthritis.  She also has bilateral lateral thigh pain it is likely secondary to IT band syndrome.  Left knee pain is likely secondary to knee osteoarthritis.    Plan:     A shared decision making plan was used.  The patient's values and choices were respected. Prior medical records from our last visit on 5/9/2019 were reviewed today. The following represents what was discussed and decided upon by the provider and the patient.     -DIAGNOSTIC TESTS:   --X-ray of the cervical spine dated 4/5/2019 report is reviewed.  There is a 1.5 mm retrolisthesis of C4 on C5 and a 2.5 mm retrolisthesis of C5 on C6.  There is moderate loss of disc height space between C4-5 and C5-6.              --X-ray of her right thumb dated 4/5/2019 report is reviewed.  It does show osteoarthritis of the first CMC joint of the right thumb.    -INTERVENTIONS: No interventions at this time.  We discussed possible future right first CMC ultrasound-guided steroid injection in the future.    -MEDICATIONS: No changes to medications.  -  Discussed side effects of medications and proper use. Patient  verbalized understanding.    -PHYSICAL THERAPY: The patient has had 4 sessions of physical therapy and 2 sessions of occupational therapy.  I written a new prescription for physical therapy for her low back, lateral thigh and left knee pain.    -PATIENT EDUCATION: We discussed pain management in a multimodal fashion including physical therapy, occupational therapy, osteopathic manipulation, medication management and possible future injections.    -FOLLOW UP: Patient will follow-up in 6 weeks.  Advised to contact clinic if symptoms worsen or change.    Subjective:     Marisol Wang is a 59 y.o. female who presents today for follow-up regarding neck pain and right thumb pain.  Patient reports that her neck pain is nearly gone.  She has been doing her physical therapy exercises and gone to fourth physical therapy sessions and finds that it this is been very helpful.  Patient reports that her right thumb pain has worsened.  She has gone to 2 sessions of occupational therapy and is wearing her thumb brace today.  She finds that when she wears her brace she has no pain.  Pain is achy in nature.  Pain today is 1/10 at its worst is 5/10 at its best a 0/10.  Patient reports that she feels like she overworked herself the last several days and that she helped her daughter move as well as rearranging her garage and playing pickle ball.  Despite this her neck pain continues to be improved.  She reports that pain is better with rest, stretching as well as using the brace on her thumb.  She is taking ibuprofen which helps.    Patient reports that now that her neck pain is improved she would like to discuss bilateral thigh pain that has been going on for just about a year now.  She reports that pain is over the greater trochanteric area bilaterally and will have pain down the lateral thigh to just above the knee.  She also has chronic left knee pain for which she has been offered surgery and has been holding off of it for  now.  She denies any bowel or bladder changes, fevers, chills, unintentional weight loss.    No myelopathic symptoms.     Evaluation to Date: X-ray of the cervical spine dated 4/5/2019.     Treatment to Date: 4 sessions of physical therapy 2 session of occupational therapy and 1 sessions of OMT.    Patient Active Problem List   Diagnosis     Anxiety     Chondromalacia patellae     Knee pain     Migraine     Osteoarthritis of knee       Current Outpatient Medications on File Prior to Encounter   Medication Sig Dispense Refill     citalopram (CELEXA) 20 MG tablet Take 20 mg by mouth.       frovatriptan (FROVA) 2.5 MG tablet TAKE 1 TABLET BY MOUTH AT ONSET OF HEADACHE FOR MIGRAINE. MAY REPEAT DOSE IN 2 HOURS. DO NOT EXCEED 7.5MG IN 24 HOURS       ibuprofen 200 mg cap Take 600 mg by mouth every 6 (six) hours as needed.       PROGESTERONE MISC Use As Directed.       traZODone (DESYREL) 50 MG tablet Take 100 mg by mouth.       No current facility-administered medications on file prior to encounter.        No Known Allergies    Past Medical History:   Diagnosis Date     Anxiety         Review of Systems  ROS: Specifically negative for bowel/bladder dysfunction, balance changes, headache, dizziness, foot drop, fevers, chills, appetite changes, nausea/vomiting, unexplained weight loss. Otherwise 13 systems reviewed are negative. Please see the patient's intake questionnaire from today for details.    Reviewed Social, Family, Past Medical and Past Surgical history with patient, no significant changes noted since prior visit.     Objective:     /78 (Patient Site: Right Arm, Patient Position: Sitting, Cuff Size: Adult Regular)   Pulse 60   Temp 98.2  F (36.8  C) (Oral)   Resp 18   Wt 189 lb (85.7 kg)   SpO2 97%   BMI 29.16 kg/m      PHYSICAL EXAMINATION:   --CONSTITUTIONAL: Vital signs as above. No acute distress. The patient is well nourished and well groomed.  --PSYCHIATRIC:  The patient is awake, alert, oriented  to person, place, time and answering questions appropriately with clear speech. Appropriate mood and affect   --HEENT: Sclera are non-injected. Extraocular muscles are intact.   --SKIN: Skin over the face, bilateral upper extremities, and posterior torso is clean, dry, intact without rashes.  --RESPIRATORY: Normal rhythm and effort. No abnormal accessory muscle breathing patterns noted.   --GROSS MOTOR: Easily arises from a seated position.   --CERVICAL SPINE: Inspection reveals no evidence of deformity. Range of motion is not limited in cervical flexion, extension, lateral rotation. No tenderness to palpation cervical spine.    --UPPER EXTREMITY MOTOR TESTING:  Normal strength in bilateral upper extremities.  --Hand: Tenderness palpation along the right first CMC joint.  --NEUROLOGIC: Sensation to upper extremities is intact. Negative Amaya's bilaterally.   --VASCULAR: Warm upper limbs bilaterally.   --Lower extremity motor testing:  Normal strength with bilateral lower extremities.  --Lumbar spine: She has tenderness palpation along the bilateral lateral thigh area down her IT band.  She also has tenderness palpation over her left knee.    Imaging of the cervical spine: Cervical spine imaging was reviewed today.

## 2021-06-02 VITALS — HEIGHT: 68 IN | BODY MASS INDEX: 28.55 KG/M2 | WEIGHT: 188.4 LBS

## 2021-06-03 VITALS — BODY MASS INDEX: 29.2 KG/M2 | WEIGHT: 189.2 LBS

## 2021-06-03 VITALS — WEIGHT: 189 LBS | BODY MASS INDEX: 29.16 KG/M2

## 2021-09-04 ENCOUNTER — HEALTH MAINTENANCE LETTER (OUTPATIENT)
Age: 61
End: 2021-09-04

## 2021-09-28 SDOH — HEALTH STABILITY: PHYSICAL HEALTH: ON AVERAGE, HOW MANY DAYS PER WEEK DO YOU ENGAGE IN MODERATE TO STRENUOUS EXERCISE (LIKE A BRISK WALK)?: 3 DAYS

## 2021-09-28 SDOH — ECONOMIC STABILITY: TRANSPORTATION INSECURITY
IN THE PAST 12 MONTHS, HAS LACK OF TRANSPORTATION KEPT YOU FROM MEETINGS, WORK, OR FROM GETTING THINGS NEEDED FOR DAILY LIVING?: NO

## 2021-09-28 SDOH — ECONOMIC STABILITY: INCOME INSECURITY: IN THE LAST 12 MONTHS, WAS THERE A TIME WHEN YOU WERE NOT ABLE TO PAY THE MORTGAGE OR RENT ON TIME?: NO

## 2021-09-28 SDOH — ECONOMIC STABILITY: FOOD INSECURITY: WITHIN THE PAST 12 MONTHS, YOU WORRIED THAT YOUR FOOD WOULD RUN OUT BEFORE YOU GOT MONEY TO BUY MORE.: NEVER TRUE

## 2021-09-28 SDOH — ECONOMIC STABILITY: TRANSPORTATION INSECURITY
IN THE PAST 12 MONTHS, HAS THE LACK OF TRANSPORTATION KEPT YOU FROM MEDICAL APPOINTMENTS OR FROM GETTING MEDICATIONS?: NO

## 2021-09-28 SDOH — ECONOMIC STABILITY: INCOME INSECURITY: HOW HARD IS IT FOR YOU TO PAY FOR THE VERY BASICS LIKE FOOD, HOUSING, MEDICAL CARE, AND HEATING?: NOT HARD AT ALL

## 2021-09-28 SDOH — ECONOMIC STABILITY: FOOD INSECURITY: WITHIN THE PAST 12 MONTHS, THE FOOD YOU BOUGHT JUST DIDN'T LAST AND YOU DIDN'T HAVE MONEY TO GET MORE.: NEVER TRUE

## 2021-09-28 SDOH — HEALTH STABILITY: PHYSICAL HEALTH: ON AVERAGE, HOW MANY MINUTES DO YOU ENGAGE IN EXERCISE AT THIS LEVEL?: 60 MIN

## 2021-09-28 ASSESSMENT — ENCOUNTER SYMPTOMS
PARESTHESIAS: 0
CONSTIPATION: 0
FREQUENCY: 0
WEAKNESS: 0
PALPITATIONS: 0
SORE THROAT: 0
HEADACHES: 1
EYE PAIN: 0
HEMATURIA: 0
DIZZINESS: 0
COUGH: 0
DYSURIA: 0
BREAST MASS: 0
NAUSEA: 0
JOINT SWELLING: 0
DIARRHEA: 0
FEVER: 0
MYALGIAS: 0
CHILLS: 0
HEMATOCHEZIA: 0
HEARTBURN: 0
NERVOUS/ANXIOUS: 0
SHORTNESS OF BREATH: 0
ABDOMINAL PAIN: 0
ARTHRALGIAS: 1

## 2021-09-28 ASSESSMENT — SOCIAL DETERMINANTS OF HEALTH (SDOH)
IN A TYPICAL WEEK, HOW MANY TIMES DO YOU TALK ON THE PHONE WITH FAMILY, FRIENDS, OR NEIGHBORS?: THREE TIMES A WEEK
HOW OFTEN DO YOU ATTEND CHURCH OR RELIGIOUS SERVICES?: 1 TO 4 TIMES PER YEAR
HOW OFTEN DO YOU GET TOGETHER WITH FRIENDS OR RELATIVES?: TWICE A WEEK
DO YOU BELONG TO ANY CLUBS OR ORGANIZATIONS SUCH AS CHURCH GROUPS UNIONS, FRATERNAL OR ATHLETIC GROUPS, OR SCHOOL GROUPS?: NO

## 2021-09-28 ASSESSMENT — LIFESTYLE VARIABLES
HOW OFTEN DO YOU HAVE A DRINK CONTAINING ALCOHOL: 2-3 TIMES A WEEK
HOW MANY STANDARD DRINKS CONTAINING ALCOHOL DO YOU HAVE ON A TYPICAL DAY: 1 OR 2
HOW OFTEN DO YOU HAVE SIX OR MORE DRINKS ON ONE OCCASION: NEVER

## 2021-09-28 NOTE — PROGRESS NOTES
"Pre-Visit Planning     Appointment Notes for this encounter:   I need a yearly checking. Also, I have moved and will be spending time in both [place] and [place]. I need to collect my information and re-up my prescriptions for this change.    Questionnaires Reviewed/Assigned  No additional questionnaires are needed       Patient preferred phone number: [phone]      Spoke to patient via phone. Patient does not have additional questions or concerns.        Visit is preventive. Reviewed purpose of preventive visit with patient.    Patient is established.    Patient reminded of date and time. Barriers addressed: yes    Health Maintenance Due   Topic Date Due     ANNUAL REVIEW OF HM ORDERS  Never done     ADVANCE CARE PLANNING  Never done     ZOSTER IMMUNIZATION (1 of 2) Never done     MAMMO SCREENING  05/18/2019     DTAP/TDAP/TD IMMUNIZATION (2 - Td or Tdap) 12/20/2020     PHQ-2  01/01/2021     LIPID  01/06/2021     PREVENTIVE CARE VISIT  01/27/2021     INFLUENZA VACCINE (1) 09/01/2021     Patient is due for:  mammogram  Patient declined appointment.    [facility]  Patient is active on [facility].    Questionnaire Review   Advised patient to arrive early in order to complete questionnaires.    Call Summary  \"Thank you for your time today.  If anything comes up before your appointment, please feel free to contact us at [phone].\"  "

## 2021-09-29 ENCOUNTER — OFFICE VISIT (OUTPATIENT)
Dept: FAMILY MEDICINE | Facility: CLINIC | Age: 61
End: 2021-09-29
Payer: COMMERCIAL

## 2021-09-29 VITALS
RESPIRATION RATE: 16 BRPM | TEMPERATURE: 98.2 F | HEIGHT: 67 IN | WEIGHT: 168.6 LBS | SYSTOLIC BLOOD PRESSURE: 118 MMHG | DIASTOLIC BLOOD PRESSURE: 76 MMHG | BODY MASS INDEX: 26.46 KG/M2 | HEART RATE: 68 BPM

## 2021-09-29 DIAGNOSIS — Z13.220 SCREENING FOR HYPERLIPIDEMIA: ICD-10-CM

## 2021-09-29 DIAGNOSIS — N95.1 MENOPAUSAL SYNDROME (HOT FLASHES): ICD-10-CM

## 2021-09-29 DIAGNOSIS — G43.009 NONINTRACTABLE MIGRAINE, UNSPECIFIED MIGRAINE TYPE: ICD-10-CM

## 2021-09-29 DIAGNOSIS — Z13.0 SCREENING FOR BLOOD DISEASE: Primary | ICD-10-CM

## 2021-09-29 DIAGNOSIS — Z12.31 VISIT FOR SCREENING MAMMOGRAM: ICD-10-CM

## 2021-09-29 DIAGNOSIS — F51.01 PRIMARY INSOMNIA: ICD-10-CM

## 2021-09-29 DIAGNOSIS — Z00.00 ROUTINE GENERAL MEDICAL EXAMINATION AT A HEALTH CARE FACILITY: ICD-10-CM

## 2021-09-29 DIAGNOSIS — N28.9 DECREASED RENAL FUNCTION: ICD-10-CM

## 2021-09-29 DIAGNOSIS — Z13.1 SCREENING FOR DIABETES MELLITUS: ICD-10-CM

## 2021-09-29 LAB
ERYTHROCYTE [DISTWIDTH] IN BLOOD BY AUTOMATED COUNT: 12.6 % (ref 10–15)
HCT VFR BLD AUTO: 39.5 % (ref 35–47)
HGB BLD-MCNC: 13 G/DL (ref 11.7–15.7)
MCH RBC QN AUTO: 31 PG (ref 26.5–33)
MCHC RBC AUTO-ENTMCNC: 32.9 G/DL (ref 31.5–36.5)
MCV RBC AUTO: 94 FL (ref 78–100)
PLATELET # BLD AUTO: 294 10E3/UL (ref 150–450)
RBC # BLD AUTO: 4.2 10E6/UL (ref 3.8–5.2)
WBC # BLD AUTO: 5.8 10E3/UL (ref 4–11)

## 2021-09-29 PROCEDURE — 90472 IMMUNIZATION ADMIN EACH ADD: CPT | Performed by: PHYSICIAN ASSISTANT

## 2021-09-29 PROCEDURE — 80053 COMPREHEN METABOLIC PANEL: CPT | Performed by: PHYSICIAN ASSISTANT

## 2021-09-29 PROCEDURE — 36415 COLL VENOUS BLD VENIPUNCTURE: CPT | Performed by: PHYSICIAN ASSISTANT

## 2021-09-29 PROCEDURE — 99396 PREV VISIT EST AGE 40-64: CPT | Mod: 25 | Performed by: PHYSICIAN ASSISTANT

## 2021-09-29 PROCEDURE — 90715 TDAP VACCINE 7 YRS/> IM: CPT | Performed by: PHYSICIAN ASSISTANT

## 2021-09-29 PROCEDURE — 90471 IMMUNIZATION ADMIN: CPT | Performed by: PHYSICIAN ASSISTANT

## 2021-09-29 PROCEDURE — 90682 RIV4 VACC RECOMBINANT DNA IM: CPT | Performed by: PHYSICIAN ASSISTANT

## 2021-09-29 PROCEDURE — 85027 COMPLETE CBC AUTOMATED: CPT | Performed by: PHYSICIAN ASSISTANT

## 2021-09-29 PROCEDURE — 80061 LIPID PANEL: CPT | Performed by: PHYSICIAN ASSISTANT

## 2021-09-29 RX ORDER — TRAZODONE HYDROCHLORIDE 50 MG/1
100 TABLET, FILM COATED ORAL AT BEDTIME
Qty: 180 TABLET | Refills: 3 | Status: SHIPPED | OUTPATIENT
Start: 2021-09-29

## 2021-09-29 RX ORDER — CITALOPRAM HYDROBROMIDE 20 MG/1
20 TABLET ORAL DAILY
Qty: 90 TABLET | Refills: 3 | Status: SHIPPED | OUTPATIENT
Start: 2021-09-29

## 2021-09-29 RX ORDER — FROVATRIPTAN SUCCINATE 2.5 MG/1
TABLET, FILM COATED ORAL
Qty: 18 TABLET | Refills: 0 | Status: SHIPPED | OUTPATIENT
Start: 2021-09-29

## 2021-09-29 ASSESSMENT — ENCOUNTER SYMPTOMS
SORE THROAT: 0
HEADACHES: 1
PARESTHESIAS: 0
EYE PAIN: 0
DYSURIA: 0
CHILLS: 0
MYALGIAS: 0
NAUSEA: 0
BREAST MASS: 0
CONSTIPATION: 0
PALPITATIONS: 0
HEMATURIA: 0
SHORTNESS OF BREATH: 0
NERVOUS/ANXIOUS: 0
HEMATOCHEZIA: 0
ARTHRALGIAS: 1
DIZZINESS: 0
HEARTBURN: 0
JOINT SWELLING: 0
FEVER: 0
ABDOMINAL PAIN: 0
FREQUENCY: 0
COUGH: 0
WEAKNESS: 0
DIARRHEA: 0

## 2021-09-29 ASSESSMENT — MIFFLIN-ST. JEOR: SCORE: 1372.39

## 2021-09-29 NOTE — PROGRESS NOTES
SUBJECTIVE:   CC: Marisol Wang is an 61 year old woman who presents for preventive health visit.       Patient has been advised of split billing requirements and indicates understanding: Yes  Healthy Habits:     Getting at least 3 servings of Calcium per day:  Yes    Bi-annual eye exam:  NO    Dental care twice a year:  NO    Sleep apnea or symptoms of sleep apnea:  Excessive snoring    Diet:  Gluten-free/reduced and Breakfast skipped    Frequency of exercise:  4-5 days/week    Duration of exercise:  30-45 minutes    Taking medications regularly:  No    Medication side effects:  None    PHQ-2 Total Score: 2    Additional concerns today:  Yes                    Today's PHQ-2 Score:   PHQ-2 ( 1999 [employer]) 9/28/2021   Q1: Little interest or pleasure in doing things 1   Q2: Feeling down, depressed or hopeless 1   PHQ-2 Score 2   Q1: Little interest or pleasure in doing things Several days   Q2: Feeling down, depressed or hopeless Several days   PHQ-2 Score 2       Abuse: Current or Past (Physical, Sexual or Emotional) - No  Do you feel safe in your environment? Yes    Have you ever done Advance Care Planning? (For example, a Health Directive, POLST, or a discussion with a medical provider or your loved ones about your wishes): No, advance care planning information given to patient to review.  Patient declined advance care planning discussion at this time.    Social History     Tobacco Use     Smoking status: Never Smoker     Smokeless tobacco: Never Used   Substance Use Topics     Alcohol use: Yes     Comment: social - once every 2 wks         Alcohol Use 9/28/2021   Prescreen: >3 drinks/day or >7 drinks/week? No   Prescreen: >3 drinks/day or >7 drinks/week? -       Reviewed orders with patient.  Reviewed health maintenance and updated orders accordingly - Yes  BP Readings from Last 3 Encounters:   09/29/21 118/76   01/27/20 127/80   12/06/18 103/68    Wt Readings from Last 3 Encounters:   09/29/21 76.5 kg  (168 lb 9.6 oz)   20 84.6 kg (186 lb 8 oz)   18 84 kg (185 lb 3.2 oz)                  Patient Active Problem List   Diagnosis     Anxiety     CARDIOVASCULAR SCREENING; LDL GOAL LESS THAN 160     Migraine     Chondromalacia of patella     Osteoarthritis of knee     Knee pain     Past Surgical History:   Procedure Laterality Date     BUNIONECTOMY IVORY BILATERAL  2006      SECTION      2001     COLONOSCOPY  2014    Dr. Wooten [facility]     COLONOSCOPY N/A 2014    Procedure: COMBINED COLONOSCOPY, SINGLE BIOPSY/POLYPECTOMY BY BIOPSY;  Surgeon: Shanda Wooten MD;  Location: [facility]     HEMORRHOIDECTOMY       THYROID SURGERY         Social History     Tobacco Use     Smoking status: Never Smoker     Smokeless tobacco: Never Used   Substance Use Topics     Alcohol use: Yes     Comment: social - once every 2 wks     Family History   Problem Relation Age of Onset     Heart Disease Mother         enlarged heart     Hypertension Mother      Heart Disease Father         open heart surgery, arrythmia     Cancer - colorectal Father         diagnosed at age 65     Heart Disease Maternal Grandmother         MI     Cancer Maternal Grandfather         bone     Cerebrovascular Disease Paternal Grandfather      Diabetes No family hx of      Breast Cancer No family hx of          Current Outpatient Medications   Medication Sig Dispense Refill     citalopram (CELEXA) 20 MG tablet Take 1 tablet (20 mg) by mouth daily 90 tablet 3     frovatriptan (FROVA) 2.5 MG tablet TAKE 1 TABLET BY MOUTH AT ONSET OF HEADACHE FOR MIGRAINE. MAY REPEAT DOSE IN 2 HOURS. DO NOT EXCEED 7.5MG IN 24 HOURS 18 tablet 0     traZODone (DESYREL) 50 MG tablet Take 2 tablets (100 mg) by mouth At Bedtime 180 tablet 3     IBUPROFEN          Breast Cancer Screening:  Any new diagnosis of family breast, ovarian, or bowel cancer? No    FHS-7: No flowsheet data found.  click delete button to remove this line now  Mammogram Screening:  "Recommended mammography every 1-2 years with patient discussion and risk factor consideration  Pertinent mammograms are reviewed under the imaging tab.    History of abnormal Pap smear: NO - age 30-65 PAP every 5 years with negative HPV co-testing recommended  PAP / HPV Latest Ref Rng & Units 12/6/2018 8/23/2016 5/14/2013   PAP (Historical) - NIL NIL NIL   HPV16 NEG:Negative Negative Negative -   HPV18 NEG:Negative Negative Negative -   HRHPV NEG:Negative Negative Negative -     Reviewed and updated as needed this visit by clinical staff  Tobacco  Allergies    Med Hx  Surg Hx  Fam Hx  Soc Hx        Reviewed and updated as needed this visit by Provider                    Review of Systems   Constitutional: Negative for chills and fever.   HENT: Negative for congestion, ear pain, hearing loss and sore throat.    Eyes: Negative for pain and visual disturbance.   Respiratory: Negative for cough and shortness of breath.    Cardiovascular: Negative for chest pain, palpitations and peripheral edema.   Gastrointestinal: Negative for abdominal pain, constipation, diarrhea, heartburn, hematochezia and nausea.   Breasts:  Negative for tenderness, breast mass and discharge.   Genitourinary: Negative for dysuria, frequency, genital sores, hematuria, pelvic pain, urgency, vaginal bleeding and vaginal discharge.   Musculoskeletal: Positive for arthralgias. Negative for joint swelling and myalgias.   Skin: Negative for rash.   Neurological: Positive for headaches. Negative for dizziness, weakness and paresthesias.   Psychiatric/Behavioral: Negative for mood changes. The patient is not nervous/anxious.           OBJECTIVE:   /76 (BP Location: Right arm, Patient Position: Sitting, Cuff Size: Adult Regular)   Pulse 68   Temp 98.2  F (36.8  C) (Oral)   Resp 16   Ht 1.702 m (5' 7\")   Wt 76.5 kg (168 lb 9.6 oz)   BMI 26.41 kg/m    Physical Exam  GENERAL APPEARANCE: healthy, alert and no distress  EYES: Eyes grossly " normal to inspection, PERRL and conjunctivae and sclerae normal  HENT: ear canals and TM's normal, nose and mouth without ulcers or lesions, oropharynx clear and oral mucous membranes moist  NECK: no adenopathy, no asymmetry, masses, or scars and thyroid normal to palpation  RESP: lungs clear to auscultation - no rales, rhonchi or wheezes  BREAST: normal without masses, tenderness or nipple discharge and no palpable axillary masses or adenopathy  CV: regular rate and rhythm, normal S1 S2, no S3 or S4, no murmur, click or rub, no peripheral edema and peripheral pulses strong  ABDOMEN: soft, nontender, no hepatosplenomegaly, no masses and bowel sounds normal  MS: no musculoskeletal defects are noted and gait is age appropriate without ataxia  SKIN: no suspicious lesions or rashes  NEURO: Normal strength and tone, sensory exam grossly normal, mentation intact and speech normal  PSYCH: mentation appears normal and affect normal/bright    Diagnostic Test Results:  Labs reviewed in Epic    ASSESSMENT/PLAN:   (Z13.0) Screening for blood disease  (primary encounter diagnosis)  Comment:   Plan: CBC with platelets            (Z13.220) Screening for hyperlipidemia  Comment:   Plan: Lipid panel reflex to direct LDL Fasting            (Z00.00) Routine general medical examination at a health care facility  Comment:   Plan:     (Z13.1) Screening for diabetes mellitus  Comment:   Plan: Comprehensive metabolic panel            (Z12.31) Visit for screening mammogram  Comment:   Plan: MA Screen Bilateral w/Reji, CANCELED: MA         SCREENING DIGITAL BILAT - Future  (s+30)            (N95.1) Menopausal syndrome (hot flashes)  Comment:   Plan: citalopram (CELEXA) 20 MG tablet            (F51.01) Primary insomnia  Comment:   Plan: traZODone (DESYREL) 50 MG tablet            (G43.009) Nonintractable migraine, unspecified migraine type  Comment:   Plan: frovatriptan (FROVA) 2.5 MG tablet              Patient has been advised of split  "billing requirements and indicates understanding: Yes  COUNSELING:  Reviewed preventive health counseling, as reflected in patient instructions       Regular exercise       Healthy diet/nutrition       Vision screening       Hearing screening    Estimated body mass index is 26.41 kg/m  as calculated from the following:    Height as of this encounter: 1.702 m (5' 7\").    Weight as of this encounter: 76.5 kg (168 lb 9.6 oz).        She reports that she has never smoked. She has never used smokeless tobacco.      Counseling Resources:  ATP IV Guidelines  Pooled Cohorts Equation Calculator  Breast Cancer Risk Calculator  BRCA-Related Cancer Risk Assessment: FHS-7 Tool  FRAX Risk Assessment  ICSI Preventive Guidelines  Dietary Guidelines for Americans, 2010  USDA's MyPlate  ASA Prophylaxis  Lung CA Screening    Ramona Ann Aaseby-Aguilera, PA-C  [facility]  "

## 2021-09-29 NOTE — NURSING NOTE
Prior to immunization administration, verified patients identity using patient s name and date of birth. Please see Immunization Activity for additional information.     Screening Questionnaire for Adult Immunization    Are you sick today?   No   Do you have allergies to medications, food, a vaccine component or latex?   No   Have you ever had a serious reaction after receiving a vaccination?   No   Do you have a long-term health problem with heart, lung, kidney, or metabolic disease (e.g., diabetes), asthma, a blood disorder, no spleen, complement component deficiency, a cochlear implant, or a spinal fluid leak?  Are you on long-term aspirin therapy?   No   Do you have cancer, leukemia, HIV/AIDS, or any other immune system problem?   No   Do you have a parent, brother, or sister with an immune system problem?   No   In the past 3 months, have you taken medications that affect  your immune system, such as prednisone, other steroids, or anticancer drugs; drugs for the treatment of rheumatoid arthritis, Crohn s disease, or psoriasis; or have you had radiation treatments?   No   Have you had a seizure, or a brain or other nervous system problem?   No   During the past year, have you received a transfusion of blood or blood    products, or been given immune (gamma) globulin or antiviral drug?   No   For women: Are you pregnant or is there a chance you could become       pregnant during the next month?   No   Have you received any vaccinations in the past 4 weeks?   No     Immunization questionnaire answers were all negative.        Per orders of [facility], injection of TDAP given by Josee Duron CMA. Patient instructed to remain in clinic for 15 minutes afterwards, and to report any adverse reaction to me immediately.       Screening performed by Josee Duron CMA on 9/29/2021 at 4:41 PM.

## 2021-09-30 LAB
ALBUMIN SERPL-MCNC: 3.8 G/DL (ref 3.4–5)
ALP SERPL-CCNC: 52 U/L (ref 40–150)
ALT SERPL W P-5'-P-CCNC: 19 U/L (ref 0–50)
ANION GAP SERPL CALCULATED.3IONS-SCNC: 4 MMOL/L (ref 3–14)
AST SERPL W P-5'-P-CCNC: 15 U/L (ref 0–45)
BILIRUB SERPL-MCNC: 0.4 MG/DL (ref 0.2–1.3)
BUN SERPL-MCNC: 15 MG/DL (ref 7–30)
CALCIUM SERPL-MCNC: 9.3 MG/DL (ref 8.5–10.1)
CHLORIDE BLD-SCNC: 109 MMOL/L (ref 94–109)
CHOLEST SERPL-MCNC: 179 MG/DL
CO2 SERPL-SCNC: 26 MMOL/L (ref 20–32)
CREAT SERPL-MCNC: 1.12 MG/DL (ref 0.52–1.04)
FASTING STATUS PATIENT QL REPORTED: YES
GFR SERPL CREATININE-BSD FRML MDRD: 54 ML/MIN/1.73M2
GLUCOSE BLD-MCNC: 73 MG/DL (ref 70–99)
HDLC SERPL-MCNC: 50 MG/DL
LDLC SERPL CALC-MCNC: 95 MG/DL
NONHDLC SERPL-MCNC: 129 MG/DL
POTASSIUM BLD-SCNC: 4.6 MMOL/L (ref 3.4–5.3)
PROT SERPL-MCNC: 7.1 G/DL (ref 6.8–8.8)
SODIUM SERPL-SCNC: 139 MMOL/L (ref 133–144)
TRIGL SERPL-MCNC: 168 MG/DL

## 2021-10-04 ENCOUNTER — MYC MEDICAL ADVICE (OUTPATIENT)
Dept: FAMILY MEDICINE | Facility: CLINIC | Age: 61
End: 2021-10-04

## 2021-10-29 ENCOUNTER — VIRTUAL VISIT (OUTPATIENT)
Dept: FAMILY MEDICINE | Facility: CLINIC | Age: 61
End: 2021-10-29
Payer: COMMERCIAL

## 2021-10-29 DIAGNOSIS — Z79.890 HORMONE REPLACEMENT THERAPY (HRT): Primary | ICD-10-CM

## 2021-10-29 PROCEDURE — 99213 OFFICE O/P EST LOW 20 MIN: CPT | Mod: 95 | Performed by: PHYSICIAN ASSISTANT

## 2021-10-29 RX ORDER — ESTRADIOL 0.03 MG/D
1 PATCH TRANSDERMAL WEEKLY
Qty: 12 PATCH | Refills: 1 | Status: SHIPPED | OUTPATIENT
Start: 2021-10-29

## 2021-10-29 RX ORDER — PROGESTERONE 100 MG/1
100 CAPSULE ORAL DAILY
Qty: 90 CAPSULE | Refills: 3 | Status: SHIPPED | OUTPATIENT
Start: 2021-10-29

## 2021-10-29 NOTE — PROGRESS NOTES
Marisol is a 61 year old who is being evaluated via a billable video visit.      How would you like to obtain your AVS? MyChart  If the video visit is dropped, the invitation should be resent by: Text to cell phone: [phone]  Will anyone else be joining your video visit? No    Video Start Time: 2:12 PM    Assessment & Plan     Hormone replacement therapy (HRT)  Risks, benefits, side effects and intended purposes discussed.   Has intact uterus and ovaries   Does get migraines so well start on lowest dose patch and requested follow-up in 3 months     - estradiol (FEMPATCH) 0.025 MG/24HR weekly patch; Place 1 patch onto the skin once a week  - progesterone (PROMETRIUM) 100 MG capsule; Take 1 capsule (100 mg) by mouth daily        No follow-ups on file.    Ramona Ann Aaseby-Aguilera, PA-C  [facility]    Yannick Damon is a 61 year old who presents for the following health issues     HPI     Would like to discuss hormonal therapy.  Positive for Vaginal dryness .  Had a compounded progesterone oil from another provider.  Does get migraines.         Review of Systems   Constitutional, HEENT, cardiovascular, pulmonary, gi and gu systems are negative, except as otherwise noted.      Objective           Vitals:  No vitals were obtained today due to virtual visit.    Physical Exam   GENERAL: Healthy, alert and no distress  EYES: Eyes grossly normal to inspection.  No discharge or erythema, or obvious scleral/conjunctival abnormalities.  RESP: No audible wheeze, cough, or visible cyanosis.  No visible retractions or increased work of breathing.    SKIN: Visible skin clear. No significant rash, abnormal pigmentation or lesions.  NEURO: Cranial nerves grossly intact.  Mentation and speech appropriate for age.  PSYCH: Mentation appears normal, affect normal/bright, judgement and insight intact, normal speech and appearance well-groomed.                Video-Visit Details    Type of service:  Video  Visit    Video End Time:2:22 PM    Originating Location (pt. Location): Home    Distant Location (provider location):  [facility]     Platform used for Video Visit: Amber

## 2021-10-30 ENCOUNTER — HEALTH MAINTENANCE LETTER (OUTPATIENT)
Age: 61
End: 2021-10-30

## 2022-07-05 ENCOUNTER — MYC MEDICAL ADVICE (OUTPATIENT)
Dept: FAMILY MEDICINE | Facility: CLINIC | Age: 62
End: 2022-07-05

## 2022-07-05 NOTE — TELEPHONE ENCOUNTER
Patient Quality Outreach    Patient is due for the following:   Breast Cancer Screening - Mammogram    NEXT STEPS:   Mammo apt    Type of outreach:    Sent [employer] Learn message.      Questions for provider review:         Agnes Lovelace MA

## 2022-10-22 ENCOUNTER — HEALTH MAINTENANCE LETTER (OUTPATIENT)
Age: 62
End: 2022-10-22

## 2022-12-10 ENCOUNTER — HEALTH MAINTENANCE LETTER (OUTPATIENT)
Age: 62
End: 2022-12-10

## 2023-11-05 ENCOUNTER — HEALTH MAINTENANCE LETTER (OUTPATIENT)
Age: 63
End: 2023-11-05

## 2024-01-14 ENCOUNTER — HEALTH MAINTENANCE LETTER (OUTPATIENT)
Age: 64
End: 2024-01-14